# Patient Record
Sex: FEMALE | Race: WHITE | NOT HISPANIC OR LATINO | Employment: FULL TIME | URBAN - NONMETROPOLITAN AREA
[De-identification: names, ages, dates, MRNs, and addresses within clinical notes are randomized per-mention and may not be internally consistent; named-entity substitution may affect disease eponyms.]

---

## 2017-03-06 ENCOUNTER — HOSPITAL ENCOUNTER (OUTPATIENT)
Dept: ULTRASOUND IMAGING | Facility: HOSPITAL | Age: 42
Discharge: HOME | End: 2017-03-06
Admitting: NURSE PRACTITIONER

## 2017-03-06 ENCOUNTER — TRANSCRIBE ORDERS (OUTPATIENT)
Dept: LAB | Facility: HOSPITAL | Age: 42
End: 2017-03-06

## 2017-03-06 DIAGNOSIS — N94.89 PELVIC PRESSURE SYNDROME: ICD-10-CM

## 2017-03-06 DIAGNOSIS — N94.89 PELVIC PRESSURE SYNDROME: Primary | ICD-10-CM

## 2017-03-06 PROCEDURE — 76830 TRANSVAGINAL US NON-OB: CPT

## 2017-03-14 ENCOUNTER — OFFICE VISIT (OUTPATIENT)
Dept: OBSTETRICS AND GYNECOLOGY | Facility: CLINIC | Age: 42
End: 2017-03-14

## 2017-03-14 VITALS
DIASTOLIC BLOOD PRESSURE: 90 MMHG | HEIGHT: 67 IN | BODY MASS INDEX: 41.28 KG/M2 | SYSTOLIC BLOOD PRESSURE: 150 MMHG | WEIGHT: 263 LBS

## 2017-03-14 DIAGNOSIS — R10.2 PELVIC PAIN IN FEMALE: Primary | ICD-10-CM

## 2017-03-14 DIAGNOSIS — D39.10 OVARIAN LOW MALIGNANT POTENTIAL TUMOR: ICD-10-CM

## 2017-03-14 DIAGNOSIS — N83.202 CYST OF LEFT OVARY: ICD-10-CM

## 2017-03-14 PROCEDURE — 99203 OFFICE O/P NEW LOW 30 MIN: CPT | Performed by: OBSTETRICS & GYNECOLOGY

## 2017-03-14 RX ORDER — LISINOPRIL 10 MG/1
TABLET ORAL
COMMUNITY
Start: 2017-01-24 | End: 2019-10-24

## 2017-03-14 RX ORDER — LEVOTHYROXINE SODIUM 112 UG/1
112 TABLET ORAL DAILY
COMMUNITY
End: 2019-10-24

## 2017-03-14 RX ORDER — FERROUS SULFATE 325(65) MG
650 TABLET ORAL NIGHTLY
COMMUNITY

## 2017-03-14 NOTE — PROGRESS NOTES
Subjective   Gaviota Casiano is a 42 y.o. female is being seen for consultation today at the request of LIDIA Mobley.  1 week history of midline severe pelvic pain that has now resolved.  S/P Rt S&O for suspected benign neoplasm 8 years ago but pathology showed LMP.  Had referral with [facility] and no adjuvant therapy or additional surgery indicated.  Has long standing hx of lower abdominal pressure.    History of Present Illness    The following portions of the patient's history were reviewed and updated as appropriate: allergies, current medications, past family history, past medical history, past social history, past surgical history and problem list.    Review of Systems   Constitutional: Negative for fever and unexpected weight change.   Eyes: Negative for photophobia and visual disturbance.   Respiratory: Negative for cough, choking and shortness of breath.    Cardiovascular: Negative for chest pain and leg swelling.   Gastrointestinal: Negative for abdominal pain, constipation, diarrhea and nausea.   Endocrine: Negative for cold intolerance and heat intolerance.   Genitourinary: Positive for pelvic pain. Negative for dyspareunia, menstrual problem, vaginal bleeding and vaginal discharge.   Musculoskeletal: Negative for back pain and gait problem.   Neurological: Negative for light-headedness, numbness and headaches.   Psychiatric/Behavioral: Negative for confusion, decreased concentration and dysphoric mood.       Objective   Physical Exam   Constitutional: She is oriented to person, place, and time. She appears well-developed and well-nourished.   Neck: Normal range of motion. Neck supple. No tracheal deviation present. No thyromegaly present.   Cardiovascular: Normal rate and regular rhythm.    Pulmonary/Chest: Effort normal and breath sounds normal.   Abdominal: Soft. Bowel sounds are normal. She exhibits no distension. There is no tenderness.   Neurological: She is alert and oriented to person,  place, and time.   Skin: Skin is warm and dry.   Psychiatric: She has a normal mood and affect. Her behavior is normal. Judgment and thought content normal.   Nursing note and vitals reviewed.        Assessment/Plan   Gaviota was seen today for transitional care management.    Diagnoses and all orders for this visit:    Pelvic pain in female    Cyst of left ovary  Comments:  Appears hemorrhagic but given history, CT indicated to ensure lack of extrapelvic disease.    Ovarian low malignant potential tumor  Comments:  2009 had removal and referral to [facility].  Surveillance to date has been unremarkable.  Orders:  -     CT abdomen pelvis w contrast; Future  -     AFP Tumor Marker  -       -     Cancer Antigen 19-9  -     CEA  -     Lactate Dehydrogenase  -     Ovarian Malignancy Risk-MAKENNA Aguilar MD

## 2017-03-15 ENCOUNTER — HOSPITAL ENCOUNTER (OUTPATIENT)
Dept: CT IMAGING | Facility: HOSPITAL | Age: 42
Discharge: HOME | End: 2017-03-15
Attending: OBSTETRICS & GYNECOLOGY | Admitting: OBSTETRICS & GYNECOLOGY

## 2017-03-15 DIAGNOSIS — D39.10 OVARIAN LOW MALIGNANT POTENTIAL TUMOR: ICD-10-CM

## 2017-03-15 LAB — CREAT BLDA-MCNC: 1 MG/DL (ref 0.6–1.3)

## 2017-03-15 PROCEDURE — 0 IOPAMIDOL PER 1 ML: Performed by: OBSTETRICS & GYNECOLOGY

## 2017-03-15 PROCEDURE — 74177 CT ABD & PELVIS W/CONTRAST: CPT

## 2017-03-15 PROCEDURE — 82565 ASSAY OF CREATININE: CPT

## 2017-03-15 RX ADMIN — IOPAMIDOL 100 ML: 755 INJECTION, SOLUTION INTRAVENOUS at 08:00

## 2017-03-16 LAB
AFP-TM SERPL-MCNC: 2.5 NG/ML (ref 0–8.3)
CANCER AG125 SERPL-ACNC: 20.7 U/ML (ref 0–38.1)
CANCER AG125 SERPL-ACNC: 23.7 U/ML (ref 0–35)
CANCER AG19-9 SERPL-ACNC: 1 U/ML (ref 0–35)
CEA SERPL-MCNC: 0.31 NG/ML (ref 0–5)
HE4 SERPL-SCNC: 52 PMOL/L (ref 0–150)
LABORATORY COMMENT REPORT: NORMAL
LDH SERPL-CCNC: 604 U/L (ref 265–665)
POSTMENOPAUSAL INTERP: LOW: NORMAL
PREMENOPAUSAL INTERP: LOW: NORMAL
ROMA SCORE POSTMEN SERPL: 1.59
ROMA SCORE PREMEN SERPL: 0.83

## 2017-03-17 ENCOUNTER — TELEPHONE (OUTPATIENT)
Dept: OBSTETRICS AND GYNECOLOGY | Facility: CLINIC | Age: 42
End: 2017-03-17

## 2017-03-20 ENCOUNTER — OFFICE VISIT (OUTPATIENT)
Dept: OBSTETRICS AND GYNECOLOGY | Facility: CLINIC | Age: 42
End: 2017-03-20

## 2017-03-20 VITALS
DIASTOLIC BLOOD PRESSURE: 92 MMHG | WEIGHT: 266 LBS | SYSTOLIC BLOOD PRESSURE: 132 MMHG | BODY MASS INDEX: 41.75 KG/M2 | HEIGHT: 67 IN

## 2017-03-20 DIAGNOSIS — Z78.9 NON-SMOKER: ICD-10-CM

## 2017-03-20 DIAGNOSIS — N83.202 CYST OF LEFT OVARY: ICD-10-CM

## 2017-03-20 DIAGNOSIS — R10.2 PELVIC PAIN IN FEMALE: Primary | ICD-10-CM

## 2017-03-20 PROCEDURE — 99213 OFFICE O/P EST LOW 20 MIN: CPT | Performed by: OBSTETRICS & GYNECOLOGY

## 2017-03-20 NOTE — PROGRESS NOTES
Rachel Casiano is a 42 y.o. female is here today for follow-up.  CT scan unremarkable and tumor markers all normal.  Pain has subsided.    Pelvic Pain   The patient's primary symptoms include pelvic pain. The patient's pertinent negatives include no genital itching, genital lesions, genital odor, genital rash, missed menses, vaginal bleeding or vaginal discharge. This is a new problem. The current episode started 1 to 4 weeks ago. The problem occurs daily. The problem has been resolved. The pain is moderate. The problem affects the left side. She is not pregnant. Pertinent negatives include no abdominal pain, anorexia, back pain, chills, constipation, diarrhea, discolored urine, dysuria, fever, flank pain, frequency, headaches, hematuria, joint pain, joint swelling, nausea, painful intercourse, rash, sore throat, urgency or vomiting. Nothing aggravates the symptoms. She has tried nothing for the symptoms. The treatment provided no relief. She is sexually active. No, her partner does not have an STD. Her menstrual history has been regular. Her past medical history is significant for a  section and a gynecological surgery. There is no history of an abdominal surgery, an ectopic pregnancy, endometriosis, herpes simplex, menorrhagia, metrorrhagia, miscarriage, ovarian cysts, perineal abscess, PID, an STD, a terminated pregnancy or vaginosis.       The following portions of the patient's history were reviewed and updated as appropriate: allergies, current medications, past family history, past medical history, past social history, past surgical history and problem list.    Review of Systems   Constitutional: Negative for chills and fever.   HENT: Negative for sore throat.    Gastrointestinal: Negative for abdominal pain, anorexia, constipation, diarrhea, nausea and vomiting.   Genitourinary: Positive for pelvic pain. Negative for dysuria, flank pain, frequency, hematuria, menorrhagia, missed  menses, urgency and vaginal discharge.   Musculoskeletal: Negative for back pain and joint pain.   Skin: Negative for rash.   Neurological: Negative for headaches.       Objective   Physical Exam   Constitutional: She is oriented to person, place, and time. She appears well-developed and well-nourished.   Neck: Normal range of motion. Neck supple.   Cardiovascular: Normal rate.    Pulmonary/Chest: Effort normal.   Abdominal: Soft. Bowel sounds are normal.   Neurological: She is alert and oriented to person, place, and time.   Skin: Skin is warm and dry.   Psychiatric: She has a normal mood and affect. Her behavior is normal. Judgment and thought content normal.   Nursing note and vitals reviewed.        Assessment/Plan   Gaviota was seen today for pelvic pain.    Diagnoses and all orders for this visit:    Pelvic pain in female    Cyst of left ovary    Non-smoker      Repeat U/S in 4 weeks    Nawaf Aguilar MD

## 2017-04-11 ENCOUNTER — OFFICE VISIT (OUTPATIENT)
Dept: GASTROENTEROLOGY | Facility: CLINIC | Age: 42
End: 2017-04-11

## 2017-04-11 VITALS
DIASTOLIC BLOOD PRESSURE: 80 MMHG | OXYGEN SATURATION: 100 % | HEIGHT: 67 IN | HEART RATE: 72 BPM | WEIGHT: 266 LBS | SYSTOLIC BLOOD PRESSURE: 128 MMHG | BODY MASS INDEX: 41.75 KG/M2

## 2017-04-11 DIAGNOSIS — Z78.9 NONSMOKER: ICD-10-CM

## 2017-04-11 DIAGNOSIS — Z79.1 NSAID LONG-TERM USE: ICD-10-CM

## 2017-04-11 DIAGNOSIS — E66.9 OBESITY, UNSPECIFIED OBESITY SEVERITY, UNSPECIFIED OBESITY TYPE: ICD-10-CM

## 2017-04-11 DIAGNOSIS — R19.7 DIARRHEA IN ADULT PATIENT: ICD-10-CM

## 2017-04-11 DIAGNOSIS — I10 HTN (HYPERTENSION), BENIGN: ICD-10-CM

## 2017-04-11 DIAGNOSIS — R11.0 NAUSEA: ICD-10-CM

## 2017-04-11 DIAGNOSIS — R10.11 RIGHT UPPER QUADRANT ABDOMINAL PAIN: Primary | ICD-10-CM

## 2017-04-11 PROCEDURE — 99204 OFFICE O/P NEW MOD 45 MIN: CPT | Performed by: CLINICAL NURSE SPECIALIST

## 2017-04-11 RX ORDER — SODIUM, POTASSIUM,MAG SULFATES 17.5-3.13G
SOLUTION, RECONSTITUTED, ORAL ORAL
Qty: 2 BOTTLE | Refills: 0 | Status: SHIPPED | OUTPATIENT
Start: 2017-04-11 | End: 2017-05-11 | Stop reason: HOSPADM

## 2017-04-11 RX ORDER — OMEPRAZOLE 20 MG/1
20 CAPSULE, DELAYED RELEASE ORAL DAILY
Qty: 30 CAPSULE | Refills: 11 | Status: SHIPPED | OUTPATIENT
Start: 2017-04-11 | End: 2019-10-24

## 2017-04-11 NOTE — PROGRESS NOTES
Gaviota Casiano  1975  Chief Complaint   Patient presents with   • GI Problem     New patient ref by Bere Long NP for abdominal pain worse with bowel movements     Subjective   HPI  Gaviota Casiano is a 42 y.o. female who presents with a complaint of abdominal pain that is to the right side. It is associated with nausea and she has had it off and on for years. She is s/p cholecystectomy. She rates it a 9 or 10 out of 10. It is worse with a BM and radiates down to the lower right side and umbilical region. It is like a twist or squeezing pain lasting up to a several minutes. No BRBPR. No melena. She has had diarrhea. She says that this is new for her. She has not been on any recent antibiotics. She has already had 3 BMs this morning up to 5 times per day. No fever chills or sweats. She has had this change in bowels for approx 2 months. She had never had a colonoscopy. No reflux or indigestion. No upper Endoscopy. She has an order for labs by her PCP that she not had drawn yet. She has a long hx of iron def anemia chronicity is unknown, no labs today for review. She denies any family hx for colon cancer.  She does take 2-4 Advil per day and has for years.     Past Medical History:   Diagnosis Date   • Anemia    • Hypertension    • Hypothyroidism    • Ovarian cancer      Past Surgical History:   Procedure Laterality Date   •  SECTION     • CHOLECYSTECTOMY     • GASTRIC BYPASS     • LAPAROTOMY OOPHERECTOMY Left     for ovarian cancer   • MYRINGOTOMY W/ TUBES     • NOSE SURGERY     • TONSILLECTOMY       Outpatient Prescriptions Marked as Taking for the 17 encounter (Office Visit) with LIDIA Parekh   Medication Sig Dispense Refill   • Cyanocobalamin (VITAMIN B 12 PO) Take  by mouth.     • ferrous sulfate 325 (65 FE) MG tablet Take 325 mg by mouth Daily With Breakfast.     • levothyroxine (SYNTHROID, LEVOTHROID) 112 MCG tablet Take 112 mcg by mouth Daily.     • lisinopril  (PRINIVIL,ZESTRIL) 10 MG tablet        Allergies   Allergen Reactions   • Darvon [Propoxyphene] Hives     Associated with Darvocet which has been pulled from shelve.   • Penicillins Hives   • Percocet [Oxycodone-Acetaminophen] Hives     Social History     Social History   • Marital status: Unknown     Spouse name: N/A   • Number of children: N/A   • Years of education: N/A     Occupational History   • Not on file.     Social History Main Topics   • Smoking status: Former Smoker   • Smokeless tobacco: Not on file   • Alcohol use Yes      Comment: Occasional   • Drug use: No   • Sexual activity: Yes     Partners: Male     Birth control/ protection: None     Other Topics Concern   • Not on file     Social History Narrative     Family History   Problem Relation Age of Onset   • Diabetes Father    • Coronary artery disease Father    • Breast cancer Father    • Diabetes Mother    • Coronary artery disease Mother    • Stroke Mother    • No Known Problems Brother    • No Known Problems Sister    • No Known Problems Daughter    • No Known Problems Brother    • No Known Problems Brother    • No Known Problems Sister    • Colon cancer Neg Hx    • Colon polyps Neg Hx      Health Maintenance   Topic Date Due   • TDAP/TD VACCINES (1 - Tdap) 02/27/1993   • INFLUENZA VACCINE  08/01/2016   • PAP SMEAR  03/14/2017     Review of Systems   Constitutional: Negative for activity change, appetite change, chills, diaphoresis, fatigue, fever and unexpected weight change.   HENT: Negative for ear pain, hearing loss, mouth sores, sore throat, trouble swallowing and voice change.    Eyes: Negative.    Respiratory: Negative for cough, choking, shortness of breath and wheezing.    Cardiovascular: Negative for chest pain and palpitations.   Gastrointestinal: Positive for abdominal pain and diarrhea. Negative for blood in stool, constipation, nausea and vomiting.   Endocrine: Negative for cold intolerance and heat intolerance.   Genitourinary:  "Negative for decreased urine volume, dysuria, frequency, hematuria and urgency.   Musculoskeletal: Negative for back pain, gait problem and myalgias.   Skin: Negative for color change, pallor and rash.   Allergic/Immunologic: Negative for food allergies and immunocompromised state.   Neurological: Negative for dizziness, tremors, seizures, syncope, weakness, light-headedness, numbness and headaches.   Hematological: Negative for adenopathy. Does not bruise/bleed easily.   Psychiatric/Behavioral: Negative for agitation and confusion. The patient is not nervous/anxious.    All other systems reviewed and are negative.    Objective   Vitals:    04/11/17 0821   BP: 128/80   Pulse: 72   SpO2: 100%   Weight: 266 lb (121 kg)   Height: 67\" (170.2 cm)     Body mass index is 41.66 kg/(m^2).  Physical Exam   Constitutional: She is oriented to person, place, and time. She appears well-developed and well-nourished.   HENT:   Head: Normocephalic and atraumatic.   Eyes: Pupils are equal, round, and reactive to light.   Neck: Normal range of motion. Neck supple. No tracheal deviation present.   Cardiovascular: Normal rate, regular rhythm and normal heart sounds.  Exam reveals no gallop and no friction rub.    No murmur heard.  Pulmonary/Chest: Effort normal and breath sounds normal. No respiratory distress. She has no wheezes. She has no rales. She exhibits no tenderness.   Abdominal: Soft. Bowel sounds are normal. She exhibits no distension. There is no hepatosplenomegaly. There is no tenderness. There is no rigidity, no rebound and no guarding.   Musculoskeletal: Normal range of motion. She exhibits no edema, tenderness or deformity.   Neurological: She is alert and oriented to person, place, and time. She has normal reflexes.   Skin: Skin is warm and dry. No rash noted. No pallor.   Psychiatric: She has a normal mood and affect. Her behavior is normal. Judgment and thought content normal.     Assessment/Plan   Gaviota was seen " today for gi problem.    Diagnoses and all orders for this visit:    Right upper quadrant abdominal pain  -     omeprazole (priLOSEC) 20 MG capsule; Take 1 capsule by mouth Daily.  -     Case Request; Standing  -     Implement Anesthesia Orders Day of Procedure; Standing  -     Obtain Informed Consent; Standing  -     Verify Informed Consent; Standing  -     Case Request    Nausea    Diarrhea in adult patient  -     SUPREP BOWEL PREP solution oral solution; Take as directed by office instructions provided  -     Gastrointestinal Panel, PCR  -     Fecal Leukocytes  -     Case Request; Standing  -     Implement Anesthesia Orders Day of Procedure; Standing  -     Obtain Informed Consent; Standing  -     Verify Informed Consent; Standing  -     Verify bowel prep was successful; Standing  -     Case Request    Nonsmoker    Obesity, unspecified obesity severity, unspecified obesity type    NSAID long-term use  Comments:  2-4 advil per day; suggested discontinuation    HTN (hypertension), benign  Comments:  continue BP medication the am of procedure    DISCUSSED ALL DIFFERENTIALS TO INCLUDE INFECTIOUS VS OTHER CAUSING HER SYMPTOMS. WE DISCUSSED NSAIDS OR OTHER CONTRIBUTING FACTORS. DIETARY MODIFICATIONS DISCUSSED AS WELL. EXAM BENIGN    COLONOSCOPY WITH ANESTHESIA (N/A)  EMR Dragon/transcription disclaimer: Much of this encounter note is electronic transcription/translation of spoken language to printed text. The electronic translation of spoken language may be erroneous, or at times, nonsensical words or phrases may be inadvertently transcribed. Although I have reviewed the note for such errors, some may still exist.  Body mass index is 41.66 kg/(m^2).  Return if symptoms worsen or fail to improve.      All risks, benefits, alternatives, and indications of colonoscopy and/or Endoscopy procedure have been discussed with the patient. Risks to include perforation of the colon requiring possible surgery or colostomy, risk of  bleeding from biopsies or removal of colon tissue, possibility of missing a colon polyp or cancer, or adverse drug reaction.  Benefits to include the diagnosis and management of disease of the colon and rectum. Alternatives to include barium enema, radiographic evaluation, lab testing or no intervention. Pt verbalizes understanding and agrees.

## 2017-04-17 ENCOUNTER — PROCEDURE VISIT (OUTPATIENT)
Dept: OBSTETRICS AND GYNECOLOGY | Facility: CLINIC | Age: 42
End: 2017-04-17

## 2017-04-17 ENCOUNTER — OFFICE VISIT (OUTPATIENT)
Dept: OBSTETRICS AND GYNECOLOGY | Facility: CLINIC | Age: 42
End: 2017-04-17

## 2017-04-17 VITALS
BODY MASS INDEX: 41.59 KG/M2 | SYSTOLIC BLOOD PRESSURE: 130 MMHG | HEIGHT: 67 IN | DIASTOLIC BLOOD PRESSURE: 80 MMHG | WEIGHT: 265 LBS

## 2017-04-17 DIAGNOSIS — N83.202 CYST OF LEFT OVARY: Primary | ICD-10-CM

## 2017-04-17 DIAGNOSIS — Z78.9 NON-SMOKER: ICD-10-CM

## 2017-04-17 PROCEDURE — 99213 OFFICE O/P EST LOW 20 MIN: CPT | Performed by: OBSTETRICS & GYNECOLOGY

## 2017-04-17 PROCEDURE — 76830 TRANSVAGINAL US NON-OB: CPT | Performed by: OBSTETRICS & GYNECOLOGY

## 2017-04-17 NOTE — PROGRESS NOTES
Subjective   Gaviota Casiano is a 42 y.o. female is here today for follow-up.  Pain has resolved.  U/S today shows 10 cm uterus and 2 simple cysts on left ovary.  Complex mass has resolved.    Pelvic Pain   The patient's primary symptoms include pelvic pain. The patient's pertinent negatives include no genital itching, genital lesions, genital odor, genital rash, missed menses, vaginal bleeding or vaginal discharge. This is a new problem. The current episode started more than 1 month ago. The problem occurs rarely. The problem has been resolved. The pain is moderate. The problem affects the left side. She is not pregnant. Pertinent negatives include no abdominal pain, anorexia, back pain, chills, constipation, diarrhea, discolored urine, dysuria, fever, flank pain, frequency, headaches, hematuria, joint pain, joint swelling, nausea, painful intercourse, rash, sore throat, urgency or vomiting. Nothing aggravates the symptoms. She has tried nothing for the symptoms. The treatment provided significant relief. She is sexually active. No, her partner does not have an STD. Her menstrual history has been regular. Her past medical history is significant for an abdominal surgery.       The following portions of the patient's history were reviewed and updated as appropriate: allergies, current medications, past family history, past medical history, past social history, past surgical history and problem list.    Review of Systems   Constitutional: Negative for chills and fever.   HENT: Negative for sore throat.    Eyes: Negative for photophobia and visual disturbance.   Respiratory: Negative for cough, choking and shortness of breath.    Cardiovascular: Negative for chest pain and leg swelling.   Gastrointestinal: Negative for abdominal pain, anorexia, constipation, diarrhea, nausea and vomiting.   Endocrine: Negative for cold intolerance and heat intolerance.   Genitourinary: Positive for pelvic pain. Negative for dysuria,  flank pain, frequency, hematuria, missed menses, urgency and vaginal discharge.   Musculoskeletal: Negative for back pain and joint pain.   Skin: Negative for rash.   Neurological: Negative for headaches.   Psychiatric/Behavioral: Negative for confusion, decreased concentration and dysphoric mood.       Objective   Physical Exam   Constitutional: She is oriented to person, place, and time. She appears well-developed and well-nourished.   Neck: Normal range of motion. Neck supple.   Cardiovascular: Normal rate.    Pulmonary/Chest: Effort normal.   Abdominal: Soft.   Neurological: She is alert and oriented to person, place, and time.   Skin: Skin is warm and dry.   Psychiatric: She has a normal mood and affect. Her behavior is normal. Judgment and thought content normal.   Nursing note and vitals reviewed.        Assessment/Plan   Gaviota was seen today for pelvic pain.    Diagnoses and all orders for this visit:    Cyst of left ovary    Non-smoker        Nawaf Aguilar MD

## 2017-05-09 ENCOUNTER — APPOINTMENT (OUTPATIENT)
Dept: LAB | Facility: HOSPITAL | Age: 42
End: 2017-05-09

## 2017-05-09 ENCOUNTER — TELEPHONE (OUTPATIENT)
Dept: GASTROENTEROLOGY | Facility: CLINIC | Age: 42
End: 2017-05-09

## 2017-05-09 LAB
ADV 40+41 DNA STL QL NAA+NON-PROBE: NOT DETECTED
ASTRO TYP 1-8 RNA STL QL NAA+NON-PROBE: NOT DETECTED
C CAYETANENSIS DNA STL QL NAA+NON-PROBE: NOT DETECTED
C DIFF TOX GENS STL QL NAA+PROBE: NOT DETECTED
CAMPY SP DNA.DIARRHEA STL QL NAA+PROBE: NOT DETECTED
CRYPTOSP STL CULT: NOT DETECTED
E COLI DNA SPEC QL NAA+PROBE: NOT DETECTED
E HISTOLYT AG STL-ACNC: NOT DETECTED
EAEC PAA PLAS AGGR+AATA ST NAA+NON-PRB: NOT DETECTED
EC STX1+STX2 GENES STL QL NAA+NON-PROBE: NOT DETECTED
EPEC EAE GENE STL QL NAA+NON-PROBE: NOT DETECTED
ETEC LTA+ST1A+ST1B TOX ST NAA+NON-PROBE: NOT DETECTED
G LAMBLIA DNA SPEC QL NAA+PROBE: NOT DETECTED
NOROVIRUS GI+II RNA STL QL NAA+NON-PROBE: NOT DETECTED
P SHIGELLOIDES DNA STL QL NAA+NON-PROBE: NOT DETECTED
RV RNA STL NAA+PROBE: NOT DETECTED
SALMONELLA DNA SPEC QL NAA+PROBE: NOT DETECTED
SAPO I+II+IV+V RNA STL QL NAA+NON-PROBE: NOT DETECTED
SHIGELLA SP+EIEC IPAH ST NAA+NON-PROBE: NOT DETECTED
V CHOLERAE DNA SPEC QL NAA+PROBE: NOT DETECTED
VIBRIO DNA SPEC NAA+PROBE: NOT DETECTED
WBC STL QL MICRO: NORMAL
YERSINIA STL CULT: NOT DETECTED

## 2017-05-09 PROCEDURE — 87205 SMEAR GRAM STAIN: CPT | Performed by: CLINICAL NURSE SPECIALIST

## 2017-05-09 PROCEDURE — 87507 IADNA-DNA/RNA PROBE TQ 12-25: CPT | Performed by: CLINICAL NURSE SPECIALIST

## 2017-05-10 ENCOUNTER — ANESTHESIA EVENT (OUTPATIENT)
Dept: GASTROENTEROLOGY | Facility: HOSPITAL | Age: 42
End: 2017-05-10

## 2017-05-11 ENCOUNTER — HOSPITAL ENCOUNTER (OUTPATIENT)
Facility: HOSPITAL | Age: 42
Setting detail: HOSPITAL OUTPATIENT SURGERY
Discharge: HOME | End: 2017-05-11
Attending: INTERNAL MEDICINE | Admitting: INTERNAL MEDICINE

## 2017-05-11 ENCOUNTER — ANESTHESIA (OUTPATIENT)
Dept: GASTROENTEROLOGY | Facility: HOSPITAL | Age: 42
End: 2017-05-11

## 2017-05-11 VITALS
HEART RATE: 74 BPM | WEIGHT: 262 LBS | BODY MASS INDEX: 41.12 KG/M2 | OXYGEN SATURATION: 96 % | TEMPERATURE: 99.4 F | HEIGHT: 67 IN | RESPIRATION RATE: 20 BRPM | SYSTOLIC BLOOD PRESSURE: 112 MMHG | DIASTOLIC BLOOD PRESSURE: 92 MMHG

## 2017-05-11 DIAGNOSIS — R19.7 DIARRHEA IN ADULT PATIENT: ICD-10-CM

## 2017-05-11 DIAGNOSIS — R10.11 RIGHT UPPER QUADRANT ABDOMINAL PAIN: ICD-10-CM

## 2017-05-11 LAB — B-HCG UR QL: NEGATIVE

## 2017-05-11 PROCEDURE — 45380 COLONOSCOPY AND BIOPSY: CPT | Performed by: INTERNAL MEDICINE

## 2017-05-11 PROCEDURE — 88305 TISSUE EXAM BY PATHOLOGIST: CPT | Performed by: INTERNAL MEDICINE

## 2017-05-11 PROCEDURE — 43239 EGD BIOPSY SINGLE/MULTIPLE: CPT | Performed by: INTERNAL MEDICINE

## 2017-05-11 PROCEDURE — 45385 COLONOSCOPY W/LESION REMOVAL: CPT | Performed by: INTERNAL MEDICINE

## 2017-05-11 PROCEDURE — 81025 URINE PREGNANCY TEST: CPT | Performed by: ANESTHESIOLOGY

## 2017-05-11 PROCEDURE — 25010000002 PROPOFOL 10 MG/ML EMULSION: Performed by: NURSE ANESTHETIST, CERTIFIED REGISTERED

## 2017-05-11 RX ORDER — PROPOFOL 10 MG/ML
VIAL (ML) INTRAVENOUS AS NEEDED
Status: DISCONTINUED | OUTPATIENT
Start: 2017-05-11 | End: 2017-05-11 | Stop reason: SURG

## 2017-05-11 RX ORDER — SODIUM CHLORIDE 0.9 % (FLUSH) 0.9 %
1-10 SYRINGE (ML) INJECTION AS NEEDED
Status: DISCONTINUED | OUTPATIENT
Start: 2017-05-11 | End: 2017-05-11 | Stop reason: HOSPADM

## 2017-05-11 RX ORDER — LIDOCAINE HYDROCHLORIDE 20 MG/ML
INJECTION, SOLUTION INFILTRATION; PERINEURAL AS NEEDED
Status: DISCONTINUED | OUTPATIENT
Start: 2017-05-11 | End: 2017-05-11 | Stop reason: SURG

## 2017-05-11 RX ORDER — SODIUM CHLORIDE 9 MG/ML
100 INJECTION, SOLUTION INTRAVENOUS CONTINUOUS
Status: DISCONTINUED | OUTPATIENT
Start: 2017-05-11 | End: 2017-05-11 | Stop reason: HOSPADM

## 2017-05-11 RX ADMIN — SODIUM CHLORIDE 100 ML/HR: 9 INJECTION, SOLUTION INTRAVENOUS at 10:29

## 2017-05-11 RX ADMIN — PROPOFOL 100 MG: 10 INJECTION, EMULSION INTRAVENOUS at 11:51

## 2017-05-11 RX ADMIN — LIDOCAINE HYDROCHLORIDE 100 MG: 20 INJECTION, SOLUTION INFILTRATION; PERINEURAL at 11:51

## 2017-05-11 RX ADMIN — PROPOFOL 310 MG: 10 INJECTION, EMULSION INTRAVENOUS at 11:52

## 2017-05-11 RX ADMIN — LIDOCAINE HYDROCHLORIDE 0.5 ML: 10 INJECTION, SOLUTION EPIDURAL; INFILTRATION; INTRACAUDAL; PERINEURAL at 10:29

## 2017-05-12 LAB
CYTO UR: NORMAL
LAB AP CASE REPORT: NORMAL
LAB AP CLINICAL INFORMATION: NORMAL
Lab: NORMAL
PATH REPORT.FINAL DX SPEC: NORMAL
PATH REPORT.GROSS SPEC: NORMAL

## 2017-08-04 ENCOUNTER — OFFICE VISIT (OUTPATIENT)
Dept: OBSTETRICS AND GYNECOLOGY | Facility: CLINIC | Age: 42
End: 2017-08-04

## 2017-08-04 VITALS
WEIGHT: 265 LBS | DIASTOLIC BLOOD PRESSURE: 86 MMHG | SYSTOLIC BLOOD PRESSURE: 126 MMHG | HEIGHT: 67 IN | BODY MASS INDEX: 41.59 KG/M2

## 2017-08-04 DIAGNOSIS — Z01.419 WELL WOMAN EXAM WITH ROUTINE GYNECOLOGICAL EXAM: Primary | ICD-10-CM

## 2017-08-04 PROCEDURE — G0123 SCREEN CERV/VAG THIN LAYER: HCPCS | Performed by: NURSE PRACTITIONER

## 2017-08-04 PROCEDURE — 99396 PREV VISIT EST AGE 40-64: CPT | Performed by: NURSE PRACTITIONER

## 2017-08-04 NOTE — PROGRESS NOTES
Subjective   Gaviota Casiano is a 42 y.o. female  YOB: 1975    Est PT is here today for yearly visit.  Pt states that she is doing good with no c/o  Pt does need order for Mammo today as well      Chief Complaint   Patient presents with   • Gynecologic Exam     Here for annual exam, pap smear. Has order for BIC. No gyn complaints.        HPI    The following portions of the patient's history were reviewed and updated as appropriate: allergies, current medications, past family history, past medical history, past social history, past surgical history and problem list.    Allergies   Allergen Reactions   • Darvon [Propoxyphene] Hives     Associated with Darvocet which has been pulled from shelve.   • Penicillins Hives   • Percocet [Oxycodone-Acetaminophen] Hives       Past Medical History:   Diagnosis Date   • Anemia    • Hypertension    • Hypothyroidism    • Ovarian cancer     2009       Family History   Problem Relation Age of Onset   • Diabetes Father    • Coronary artery disease Father    • Breast cancer Father 50     Had genic testing.    • Diabetes Mother    • Coronary artery disease Mother    • Stroke Mother    • No Known Problems Brother    • No Known Problems Sister    • No Known Problems Daughter    • No Known Problems Brother    • No Known Problems Brother    • Ovarian cancer Sister 20   • Colon cancer Neg Hx    • Colon polyps Neg Hx        Social History     Social History   • Marital status: Unknown     Spouse name: N/A   • Number of children: N/A   • Years of education: N/A     Occupational History   • Not on file.     Social History Main Topics   • Smoking status: Never Smoker   • Smokeless tobacco: Never Used   • Alcohol use Yes      Comment: Occasional   • Drug use: No   • Sexual activity: Yes     Partners: Male     Birth control/ protection: None     Other Topics Concern   • Not on file     Social History Narrative         Current Outpatient Prescriptions:   •  Cyanocobalamin  (B-12 COMPLIANCE INJECTION IJ), Inject 1 dose as directed Every 30 (Thirty) Days., Disp: , Rfl:   •  ferrous sulfate 325 (65 FE) MG tablet, Take 325 mg by mouth Daily With Breakfast., Disp: , Rfl:   •  levothyroxine (SYNTHROID, LEVOTHROID) 112 MCG tablet, Take 112 mcg by mouth Daily., Disp: , Rfl:   •  lisinopril (PRINIVIL,ZESTRIL) 10 MG tablet, , Disp: , Rfl:   •  omeprazole (priLOSEC) 20 MG capsule, Take 1 capsule by mouth Daily., Disp: 30 capsule, Rfl: 11    Menstrual History:  OB History      Para Term  AB TAB SAB Ectopic Multiple Living    1 1 0 0 0 0 0 0 0 1           Patient's last menstrual period was 2017 (approximate).    Sexual History:         Could not be calculated    Past Surgical History:   Procedure Laterality Date   •  SECTION     • CHOLECYSTECTOMY     • COLONOSCOPY N/A 2017    Procedure: COLONOSCOPY WITH ANESTHESIA;  Surgeon: Suzan Muñoz MD;  Location: [facility] ENDOSCOPY;  Service:    • ENDOSCOPY N/A 2017    Procedure: ESOPHAGOGASTRODUODENOSCOPY WITH ANESTHESIA;  Surgeon: Suzan Muñoz MD;  Location: [facility] ENDOSCOPY;  Service:    • GASTRIC BYPASS     • LAPAROTOMY OOPHERECTOMY Left     for ovarian cancer   • MYRINGOTOMY W/ TUBES     • NOSE SURGERY     • TONSILLECTOMY         Review of Systems   Constitutional: Negative for activity change, appetite change, chills, diaphoresis, fatigue, fever and unexpected weight change.   HENT: Negative for congestion, ear discharge, ear pain, facial swelling, hearing loss, mouth sores, nosebleeds, postnasal drip, rhinorrhea, sinus pressure, sneezing, sore throat, tinnitus, trouble swallowing and voice change.    Eyes: Negative for photophobia, pain, discharge, redness, itching and visual disturbance.   Respiratory: Negative for apnea, cough, choking, chest tightness and shortness of breath.    Cardiovascular: Negative for chest pain, palpitations and leg swelling.   Gastrointestinal: Negative for abdominal distention,  abdominal pain, anal bleeding, blood in stool, constipation, diarrhea, nausea, rectal pain and vomiting.   Endocrine: Negative for cold intolerance and heat intolerance.   Genitourinary: Negative for decreased urine volume, difficulty urinating, dyspareunia, flank pain, frequency, genital sores, hematuria, menstrual problem, pelvic pain, urgency, vaginal bleeding, vaginal discharge and vaginal pain.   Musculoskeletal: Negative for arthralgias, back pain, joint swelling and myalgias.   Skin: Negative for color change and rash.   Allergic/Immunologic: Negative for environmental allergies.   Neurological: Negative for dizziness, syncope, weakness, numbness and headaches.   Hematological: Negative for adenopathy.   Psychiatric/Behavioral: Negative for agitation, confusion and sleep disturbance. The patient is not nervous/anxious.        Objective   Physical Exam   Constitutional: She is oriented to person, place, and time. She appears well-developed and well-nourished. No distress.   HENT:   Head: Normocephalic.   Right Ear: External ear normal.   Left Ear: External ear normal.   Eyes: Conjunctivae are normal. Right eye exhibits no discharge. Left eye exhibits no discharge. No scleral icterus.   Neck: Normal range of motion. Neck supple. Carotid bruit is not present. No tracheal deviation present. No thyromegaly present.   Cardiovascular: Normal rate, regular rhythm, normal heart sounds and intact distal pulses.    No murmur heard.  Pulmonary/Chest: Effort normal and breath sounds normal. No respiratory distress. She has no wheezes. Right breast exhibits no inverted nipple, no mass, no nipple discharge, no skin change and no tenderness. Left breast exhibits no inverted nipple, no mass, no nipple discharge, no skin change and no tenderness. Breasts are symmetrical. There is no breast swelling.   Abdominal: Soft. She exhibits no distension and no mass. There is no tenderness. There is no guarding. No hernia. Hernia  confirmed negative in the right inguinal area and confirmed negative in the left inguinal area.   Genitourinary: Rectum normal, vagina normal and uterus normal. Rectal exam shows no mass. No breast tenderness, discharge or bleeding. Pelvic exam was performed with patient supine. There is no rash, tenderness, lesion or injury on the right labia. There is no rash, tenderness, lesion or injury on the left labia. Uterus is not enlarged, not fixed and not tender. Cervix exhibits no motion tenderness, no discharge and no friability. Right adnexum displays no mass, no tenderness and no fullness. Left adnexum displays no mass, no tenderness and no fullness. No erythema, tenderness or bleeding in the vagina. No foreign body in the vagina. No signs of injury around the vagina. No vaginal discharge found.   Genitourinary Comments:   BSU normal  Urethral meatus  Normal  Perineum  Normal   Musculoskeletal: Normal range of motion. She exhibits no edema or tenderness.   Lymphadenopathy:        Head (right side): No submental, no submandibular, no tonsillar, no preauricular, no posterior auricular and no occipital adenopathy present.        Head (left side): No submental, no submandibular, no tonsillar, no preauricular, no posterior auricular and no occipital adenopathy present.     She has no cervical adenopathy.        Right cervical: No superficial cervical, no deep cervical and no posterior cervical adenopathy present.       Left cervical: No superficial cervical, no deep cervical and no posterior cervical adenopathy present.     She has no axillary adenopathy.        Right: No inguinal adenopathy present.        Left: No inguinal adenopathy present.   Neurological: She is alert and oriented to person, place, and time. Coordination normal.   Skin: Skin is warm and dry. No bruising and no rash noted. She is not diaphoretic. No erythema.   Psychiatric: She has a normal mood and affect. Her behavior is normal. Judgment and thought  "content normal.   Nursing note and vitals reviewed.        Vitals:    08/04/17 0911   BP: 126/86   BP Location: Left arm   Patient Position: Sitting   Cuff Size: Adult   Weight: 265 lb (120 kg)   Height: 67\" (170.2 cm)       Gaviota was seen today for gynecologic exam.    Diagnoses and all orders for this visit:    Well woman exam with routine gynecological exam  Comments:  Normal well woman exam.  Thin prep done.  Mammogram ordered.    Orders:  -     Liquid-based Pap Smear, Screening        Body mass index is 41.5 kg/(m^2).     Non-Smoker    MyChart Instructions Given       "

## 2017-08-07 ENCOUNTER — TRANSCRIBE ORDERS (OUTPATIENT)
Dept: ADMINISTRATIVE | Facility: HOSPITAL | Age: 42
End: 2017-08-07

## 2017-08-07 DIAGNOSIS — Z12.31 ENCOUNTER FOR SCREENING MAMMOGRAM FOR MALIGNANT NEOPLASM OF BREAST: Primary | ICD-10-CM

## 2017-08-08 LAB
GEN CATEG CVX/VAG CYTO-IMP: NORMAL
LAB AP CASE REPORT: NORMAL
LAB AP GYN ADDITIONAL INFORMATION: NORMAL
Lab: NORMAL
PATH INTERP SPEC-IMP: NORMAL
STAT OF ADQ CVX/VAG CYTO-IMP: NORMAL

## 2017-08-16 ENCOUNTER — HOSPITAL ENCOUNTER (OUTPATIENT)
Dept: MAMMOGRAPHY | Facility: HOSPITAL | Age: 42
Discharge: HOME | End: 2017-08-16
Admitting: NURSE PRACTITIONER

## 2017-08-16 DIAGNOSIS — Z12.31 ENCOUNTER FOR SCREENING MAMMOGRAM FOR MALIGNANT NEOPLASM OF BREAST: ICD-10-CM

## 2017-08-16 PROCEDURE — G0202 SCR MAMMO BI INCL CAD: HCPCS

## 2017-08-16 PROCEDURE — 77063 BREAST TOMOSYNTHESIS BI: CPT

## 2019-07-01 ENCOUNTER — TRANSCRIBE ORDERS (OUTPATIENT)
Dept: GENERAL RADIOLOGY | Facility: HOSPITAL | Age: 44
End: 2019-07-01

## 2019-07-01 ENCOUNTER — HOSPITAL ENCOUNTER (OUTPATIENT)
Dept: GENERAL RADIOLOGY | Facility: HOSPITAL | Age: 44
Discharge: HOME | End: 2019-07-01
Admitting: NURSE PRACTITIONER

## 2019-07-01 DIAGNOSIS — J31.0 RHINOSINUSITIS: ICD-10-CM

## 2019-07-01 DIAGNOSIS — J32.9 RHINOSINUSITIS: ICD-10-CM

## 2019-07-01 DIAGNOSIS — J31.0 RHINOSINUSITIS: Primary | ICD-10-CM

## 2019-07-01 DIAGNOSIS — J32.9 RHINOSINUSITIS: Primary | ICD-10-CM

## 2019-07-01 PROCEDURE — 70220 X-RAY EXAM OF SINUSES: CPT

## 2019-07-15 ENCOUNTER — TRANSCRIBE ORDERS (OUTPATIENT)
Dept: ADMINISTRATIVE | Facility: HOSPITAL | Age: 44
End: 2019-07-15

## 2019-07-15 DIAGNOSIS — Z12.39 SCREENING BREAST EXAMINATION: Primary | ICD-10-CM

## 2019-07-18 ENCOUNTER — HOSPITAL ENCOUNTER (OUTPATIENT)
Dept: MAMMOGRAPHY | Facility: HOSPITAL | Age: 44
Discharge: HOME | End: 2019-07-18
Admitting: NURSE PRACTITIONER

## 2019-07-18 DIAGNOSIS — Z12.39 SCREENING BREAST EXAMINATION: ICD-10-CM

## 2019-07-18 PROCEDURE — 77067 SCR MAMMO BI INCL CAD: CPT

## 2019-07-18 PROCEDURE — 77063 BREAST TOMOSYNTHESIS BI: CPT

## 2019-08-19 DIAGNOSIS — D50.8 OTHER IRON DEFICIENCY ANEMIA: Primary | ICD-10-CM

## 2019-08-21 ENCOUNTER — CONSULT (OUTPATIENT)
Dept: ONCOLOGY | Facility: CLINIC | Age: 44
End: 2019-08-21

## 2019-08-21 ENCOUNTER — APPOINTMENT (OUTPATIENT)
Dept: LAB | Facility: HOSPITAL | Age: 44
End: 2019-08-21

## 2019-08-21 VITALS
OXYGEN SATURATION: 100 % | TEMPERATURE: 97.7 F | WEIGHT: 255.7 LBS | HEART RATE: 75 BPM | BODY MASS INDEX: 40.13 KG/M2 | DIASTOLIC BLOOD PRESSURE: 84 MMHG | SYSTOLIC BLOOD PRESSURE: 132 MMHG | HEIGHT: 67 IN | RESPIRATION RATE: 16 BRPM

## 2019-08-21 DIAGNOSIS — D50.8 OTHER IRON DEFICIENCY ANEMIA: Primary | ICD-10-CM

## 2019-08-21 PROBLEM — D64.9 ABSOLUTE ANEMIA: Status: ACTIVE | Noted: 2019-08-21

## 2019-08-21 LAB
ALBUMIN SERPL-MCNC: 3.9 G/DL (ref 3.5–5)
ALBUMIN/GLOB SERPL: 1.3 G/DL (ref 1.1–2.5)
ALP SERPL-CCNC: 70 U/L (ref 24–120)
ALT SERPL W P-5'-P-CCNC: 26 U/L (ref 0–54)
ANION GAP SERPL CALCULATED.3IONS-SCNC: 7 MMOL/L (ref 4–13)
AST SERPL-CCNC: 26 U/L (ref 7–45)
BASOPHILS # BLD AUTO: 0.06 10*3/MM3 (ref 0–0.2)
BASOPHILS NFR BLD AUTO: 0.8 % (ref 0–1.5)
BILIRUB SERPL-MCNC: 0.3 MG/DL (ref 0.1–1)
BUN BLD-MCNC: 13 MG/DL (ref 5–21)
BUN/CREAT SERPL: 15.7 (ref 7–25)
CALCIUM SPEC-SCNC: 8.6 MG/DL (ref 8.4–10.4)
CHLORIDE SERPL-SCNC: 106 MMOL/L (ref 98–110)
CO2 SERPL-SCNC: 25 MMOL/L (ref 24–31)
CREAT BLD-MCNC: 0.83 MG/DL (ref 0.5–1.4)
DEPRECATED RDW RBC AUTO: 58.7 FL (ref 37–54)
EOSINOPHIL # BLD AUTO: 0.16 10*3/MM3 (ref 0–0.4)
EOSINOPHIL NFR BLD AUTO: 2.2 % (ref 0.3–6.2)
ERYTHROCYTE [DISTWIDTH] IN BLOOD BY AUTOMATED COUNT: 21.6 % (ref 12.3–15.4)
FERRITIN SERPL-MCNC: 4.51 NG/ML (ref 6.24–137)
FOLATE SERPL-MCNC: 11.3 NG/ML (ref 4.78–24.2)
GFR SERPL CREATININE-BSD FRML MDRD: 74 ML/MIN/1.73
GLOBULIN UR ELPH-MCNC: 3 GM/DL
GLUCOSE BLD-MCNC: 128 MG/DL (ref 70–100)
HCT VFR BLD AUTO: 31.2 % (ref 34–46.6)
HGB BLD-MCNC: 9.4 G/DL (ref 12–15.9)
IMM GRANULOCYTES # BLD AUTO: 0.02 10*3/MM3 (ref 0–0.05)
IMM GRANULOCYTES NFR BLD AUTO: 0.3 % (ref 0–0.5)
IRON 24H UR-MRATE: 23 MCG/DL (ref 42–180)
IRON SATN MFR SERPL: 5 % (ref 20–45)
LYMPHOCYTES # BLD AUTO: 1.66 10*3/MM3 (ref 0.7–3.1)
LYMPHOCYTES NFR BLD AUTO: 23.2 % (ref 19.6–45.3)
MCH RBC QN AUTO: 23.2 PG (ref 26.6–33)
MCHC RBC AUTO-ENTMCNC: 30.1 G/DL (ref 31.5–35.7)
MCV RBC AUTO: 76.8 FL (ref 79–97)
MONOCYTES # BLD AUTO: 0.45 10*3/MM3 (ref 0.1–0.9)
MONOCYTES NFR BLD AUTO: 6.3 % (ref 5–12)
NEUTROPHILS # BLD AUTO: 4.8 10*3/MM3 (ref 1.7–7)
NEUTROPHILS NFR BLD AUTO: 67.2 % (ref 42.7–76)
NRBC BLD AUTO-RTO: 0 /100 WBC (ref 0–0.2)
PLATELET # BLD AUTO: 292 10*3/MM3 (ref 140–450)
PMV BLD AUTO: 10 FL (ref 6–12)
POTASSIUM BLD-SCNC: 4 MMOL/L (ref 3.5–5.3)
PROT SERPL-MCNC: 6.9 G/DL (ref 6.3–8.7)
RBC # BLD AUTO: 4.06 10*6/MM3 (ref 3.77–5.28)
SODIUM BLD-SCNC: 138 MMOL/L (ref 135–145)
TIBC SERPL-MCNC: 474 MCG/DL (ref 225–420)
VIT B12 BLD-MCNC: 210 PG/ML (ref 239–931)
WBC NRBC COR # BLD: 7.15 10*3/MM3 (ref 3.4–10.8)

## 2019-08-21 PROCEDURE — 99205 OFFICE O/P NEW HI 60 MIN: CPT | Performed by: INTERNAL MEDICINE

## 2019-08-21 PROCEDURE — 36415 COLL VENOUS BLD VENIPUNCTURE: CPT | Performed by: INTERNAL MEDICINE

## 2019-08-21 PROCEDURE — 80053 COMPREHEN METABOLIC PANEL: CPT | Performed by: INTERNAL MEDICINE

## 2019-08-21 PROCEDURE — 82728 ASSAY OF FERRITIN: CPT | Performed by: INTERNAL MEDICINE

## 2019-08-21 PROCEDURE — 85025 COMPLETE CBC W/AUTO DIFF WBC: CPT | Performed by: INTERNAL MEDICINE

## 2019-08-21 PROCEDURE — 82607 VITAMIN B-12: CPT | Performed by: INTERNAL MEDICINE

## 2019-08-21 PROCEDURE — 83540 ASSAY OF IRON: CPT | Performed by: INTERNAL MEDICINE

## 2019-08-21 PROCEDURE — 82746 ASSAY OF FOLIC ACID SERUM: CPT | Performed by: INTERNAL MEDICINE

## 2019-08-21 PROCEDURE — 83550 IRON BINDING TEST: CPT | Performed by: INTERNAL MEDICINE

## 2019-08-21 RX ORDER — SODIUM CHLORIDE 9 MG/ML
250 INJECTION, SOLUTION INTRAVENOUS ONCE
Status: CANCELLED | OUTPATIENT
Start: 2019-08-28

## 2019-08-21 RX ORDER — DIPHENHYDRAMINE HYDROCHLORIDE 50 MG/ML
50 INJECTION INTRAMUSCULAR; INTRAVENOUS AS NEEDED
Status: CANCELLED | OUTPATIENT
Start: 2019-09-04

## 2019-08-21 RX ORDER — FAMOTIDINE 10 MG/ML
20 INJECTION, SOLUTION INTRAVENOUS AS NEEDED
Status: CANCELLED | OUTPATIENT
Start: 2019-09-04

## 2019-08-21 RX ORDER — SODIUM CHLORIDE 9 MG/ML
250 INJECTION, SOLUTION INTRAVENOUS ONCE
Status: CANCELLED | OUTPATIENT
Start: 2019-09-04

## 2019-08-21 RX ORDER — DIPHENHYDRAMINE HYDROCHLORIDE 50 MG/ML
50 INJECTION INTRAMUSCULAR; INTRAVENOUS AS NEEDED
Status: CANCELLED | OUTPATIENT
Start: 2019-08-28

## 2019-08-21 RX ORDER — FAMOTIDINE 10 MG/ML
20 INJECTION, SOLUTION INTRAVENOUS AS NEEDED
Status: CANCELLED | OUTPATIENT
Start: 2019-08-28

## 2019-08-21 NOTE — PROGRESS NOTES
[facility]  HEMATOLOGY & ONCOLOGY        Subjective     VISIT DIAGNOSIS: No diagnosis found.    REASON FOR VISIT:     Chief Complaint   Patient presents with   • Anemia        HEMATOLOGY / ONCOLOGY HISTORY:    No history exists.     [No treatment plan]  Cancer Staging Information:  Cancer Staging  No matching staging information was found for the patient.      INTERVAL HISTORY  Patient ID: Gaviota Casiano is a 44 y.o. year old female  Hx sig for gastric bypass referred for ARASH. She has been anemic all her life. Recently worse. She had a blood transfusion when she had her daughter. She is on iron patch. c-scope and egd 2017 unremarkable for bleed.  -Denies fhx of leukemia or lymphoma.  -denies n/v, sob, cp, abdominal pain,dizziness, melena, hematuria.       Review of Systems     See above. otherwise neg    Medications:    Current Outpatient Medications   Medication Sig Dispense Refill   • Cyanocobalamin (B-12 COMPLIANCE INJECTION IJ) Inject 1 dose as directed Every 30 (Thirty) Days.     • ferrous sulfate 325 (65 FE) MG tablet Take 325 mg by mouth Daily With Breakfast.     • levothyroxine (SYNTHROID, LEVOTHROID) 112 MCG tablet Take 112 mcg by mouth Daily.     • lisinopril (PRINIVIL,ZESTRIL) 10 MG tablet      • omeprazole (priLOSEC) 20 MG capsule Take 1 capsule by mouth Daily. 30 capsule 11     No current facility-administered medications for this visit.        ALLERGIES:    Allergies   Allergen Reactions   • Darvon [Propoxyphene] Hives     Associated with Darvocet which has been pulled from shelve.   • Penicillins Hives   • Percocet [Oxycodone-Acetaminophen] Hives       Objective      Vitals:    08/21/19 1305   BP: 132/84   Pulse: 75   Resp: 16   Temp: 97.7 °F (36.5 °C)   SpO2: 100%       Current Status 8/21/2019   ECOG score 0       General Appearance: unremarkable.Patient is awake, alert, oriented and in no acute distress. Patient is welldeveloped, wellnourished, and appears stated age.  HEENT:  Normocephalic. Sclerae clear, conjunctiva pink, extraocular movements intact, pupils, round, reactive to light and  accommodation. Mouth and throat are clear with moist oral mucosa.  NECK: Supple, no jugular venous distention, thyroid not enlarged.  LYMPH: No cervical, supraclavicular, axillary, or inguinal lymphadenopathy.  CHEST: Equal bilateral expansion, AP  diameter normal, resonant percussion note  LUNGS: Good air movement, no rales, rhonchi, rubs or wheezes with auscultation  CARDIO: Regular sinus rhythm, no murmurs, gallops or rubs.  ABDOMEN: Nondistended, soft, No tenderness, no guarding, no rebound, No hepatosplenomegaly. No abdominal masses. Bowel sounds positive. No hernia  GENITALIA: Not examined.  BREASTS: Not examined.  MUSKEL: No joint swelling, decreased motion, or inflammation  EXTREMS: No edema, clubbing, cyanosis, No varicose veins.  NEURO: Grossly nonfocal, Gait is coordinated and smooth, Cognition is preserved.  SKIN: No rashes, no ecchymoses, no petechia.  PSYCH: Oriented to time, place and person. Memory is preserved. Mood and affect appear normal      RECENT LABS:  Orders Only on 08/19/2019   Component Date Value Ref Range Status   • Glucose 08/21/2019 128* 70 - 100 mg/dL Final   • BUN 08/21/2019 13  5 - 21 mg/dL Final   • Creatinine 08/21/2019 0.83  0.50 - 1.40 mg/dL Final   • Sodium 08/21/2019 138  135 - 145 mmol/L Final   • Potassium 08/21/2019 4.0  3.5 - 5.3 mmol/L Final   • Chloride 08/21/2019 106  98 - 110 mmol/L Final   • CO2 08/21/2019 25.0  24.0 - 31.0 mmol/L Final   • Calcium 08/21/2019 8.6  8.4 - 10.4 mg/dL Final   • Total Protein 08/21/2019 6.9  6.3 - 8.7 g/dL Final   • Albumin 08/21/2019 3.90  3.50 - 5.00 g/dL Final   • ALT (SGPT) 08/21/2019 26  0 - 54 U/L Final   • AST (SGOT) 08/21/2019 26  7 - 45 U/L Final   • Alkaline Phosphatase 08/21/2019 70  24 - 120 U/L Final   • Total Bilirubin 08/21/2019 0.3  0.1 - 1.0 mg/dL Final   • eGFR Non African Amer 08/21/2019 74  >60 mL/min/1.73  Final   • Globulin 08/21/2019 3.0  gm/dL Final   • A/G Ratio 08/21/2019 1.3  1.1 - 2.5 g/dL Final   • BUN/Creatinine Ratio 08/21/2019 15.7  7.0 - 25.0 Final   • Anion Gap 08/21/2019 7.0  4.0 - 13.0 mmol/L Final   • Iron 08/21/2019 23* 42 - 180 mcg/dL Final   • TIBC 08/21/2019 474* 225 - 420 mcg/dL Final   • Iron Saturation 08/21/2019 5* 20 - 45 % Final   • WBC 08/21/2019 7.15  3.40 - 10.80 10*3/mm3 Final   • RBC 08/21/2019 4.06  3.77 - 5.28 10*6/mm3 Final   • Hemoglobin 08/21/2019 9.4* 12.0 - 15.9 g/dL Final   • Hematocrit 08/21/2019 31.2* 34.0 - 46.6 % Final   • MCV 08/21/2019 76.8* 79.0 - 97.0 fL Final   • MCH 08/21/2019 23.2* 26.6 - 33.0 pg Final   • MCHC 08/21/2019 30.1* 31.5 - 35.7 g/dL Final   • RDW 08/21/2019 21.6* 12.3 - 15.4 % Final   • RDW-SD 08/21/2019 58.7* 37.0 - 54.0 fl Final   • MPV 08/21/2019 10.0  6.0 - 12.0 fL Final   • Platelets 08/21/2019 292  140 - 450 10*3/mm3 Final   • Neutrophil % 08/21/2019 67.2  42.7 - 76.0 % Final   • Lymphocyte % 08/21/2019 23.2  19.6 - 45.3 % Final   • Monocyte % 08/21/2019 6.3  5.0 - 12.0 % Final   • Eosinophil % 08/21/2019 2.2  0.3 - 6.2 % Final   • Basophil % 08/21/2019 0.8  0.0 - 1.5 % Final   • Immature Grans % 08/21/2019 0.3  0.0 - 0.5 % Final   • Neutrophils, Absolute 08/21/2019 4.80  1.70 - 7.00 10*3/mm3 Final   • Lymphocytes, Absolute 08/21/2019 1.66  0.70 - 3.10 10*3/mm3 Final   • Monocytes, Absolute 08/21/2019 0.45  0.10 - 0.90 10*3/mm3 Final   • Eosinophils, Absolute 08/21/2019 0.16  0.00 - 0.40 10*3/mm3 Final   • Basophils, Absolute 08/21/2019 0.06  0.00 - 0.20 10*3/mm3 Final   • Immature Grans, Absolute 08/21/2019 0.02  0.00 - 0.05 10*3/mm3 Final   • nRBC 08/21/2019 0.0  0.0 - 0.2 /100 WBC Final       RADIOLOGY:  No results found.         Assessment/Plan 45 yo female with hx sig for gastric bypass with low iron due to malabsorption.    1. Anemia: iron 23, TIBC 474, %sat 5. On ferrous sulfate  -c-scoape 5/11/17 showed A 5 mm polyp was found in the transverse  colon. The polyp was sessile. EGD Evidence of a Behzad-en-Y gastrojejunostomy was found with normal esophagus  -will give injectafer    2. Hypothyroidiam: on synthroid    3. Low B12: on b12 shots monthly    4. HTN: on lisinopril  5. Gerd: on omeprazole       Time Spent: 60 minutes; greater than  50% of time was spent in patient counseling and care coordination.     Heath Bender MD    8/21/2019    1:13 PM

## 2019-08-22 ENCOUNTER — TELEPHONE (OUTPATIENT)
Dept: ONCOLOGY | Facility: CLINIC | Age: 44
End: 2019-08-22

## 2019-08-22 DIAGNOSIS — E53.8 VITAMIN B12 DEFICIENCY: ICD-10-CM

## 2019-08-22 RX ORDER — CYANOCOBALAMIN 1000 UG/ML
1000 INJECTION, SOLUTION INTRAMUSCULAR; SUBCUTANEOUS ONCE
OUTPATIENT
Start: 2019-11-13

## 2019-08-22 RX ORDER — CYANOCOBALAMIN 1000 UG/ML
1000 INJECTION, SOLUTION INTRAMUSCULAR; SUBCUTANEOUS ONCE
OUTPATIENT
Start: 2019-10-16

## 2019-08-22 RX ORDER — CYANOCOBALAMIN 1000 UG/ML
1000 INJECTION, SOLUTION INTRAMUSCULAR; SUBCUTANEOUS ONCE
Status: CANCELLED | OUTPATIENT
Start: 2019-08-28

## 2019-08-22 RX ORDER — CYANOCOBALAMIN 1000 UG/ML
1000 INJECTION, SOLUTION INTRAMUSCULAR; SUBCUTANEOUS ONCE
Status: CANCELLED | OUTPATIENT
Start: 2019-09-04

## 2019-08-22 RX ORDER — CYANOCOBALAMIN 1000 UG/ML
1000 INJECTION, SOLUTION INTRAMUSCULAR; SUBCUTANEOUS ONCE
OUTPATIENT
Start: 2020-01-08

## 2019-08-22 RX ORDER — CYANOCOBALAMIN 1000 UG/ML
1000 INJECTION, SOLUTION INTRAMUSCULAR; SUBCUTANEOUS ONCE
Status: CANCELLED | OUTPATIENT
Start: 2019-09-18

## 2019-08-22 RX ORDER — CYANOCOBALAMIN 1000 UG/ML
1000 INJECTION, SOLUTION INTRAMUSCULAR; SUBCUTANEOUS ONCE
OUTPATIENT
Start: 2019-12-11

## 2019-08-22 RX ORDER — CYANOCOBALAMIN 1000 UG/ML
1000 INJECTION, SOLUTION INTRAMUSCULAR; SUBCUTANEOUS ONCE
Status: CANCELLED | OUTPATIENT
Start: 2019-09-11

## 2019-08-22 NOTE — TELEPHONE ENCOUNTER
----- Message from Heath Bender MD sent at 8/22/2019 10:48 AM CDT -----  Please call the patient regarding her abnormal result. Need b12 shots. Start with weekly then monthly. tnx

## 2019-08-22 NOTE — TELEPHONE ENCOUNTER
Left message for patient that her B12 level is low and that Dr. Bender wants her to get B12 injections weekly x4 then once a month and that it will start on the 28th.

## 2019-08-28 ENCOUNTER — INFUSION (OUTPATIENT)
Dept: ONCOLOGY | Facility: HOSPITAL | Age: 44
End: 2019-08-28

## 2019-08-28 VITALS
OXYGEN SATURATION: 100 % | BODY MASS INDEX: 39.87 KG/M2 | HEART RATE: 77 BPM | WEIGHT: 254 LBS | HEIGHT: 67 IN | RESPIRATION RATE: 18 BRPM | DIASTOLIC BLOOD PRESSURE: 81 MMHG | TEMPERATURE: 97.6 F | SYSTOLIC BLOOD PRESSURE: 134 MMHG

## 2019-08-28 DIAGNOSIS — D50.8 OTHER IRON DEFICIENCY ANEMIA: Primary | ICD-10-CM

## 2019-08-28 DIAGNOSIS — E53.8 VITAMIN B12 DEFICIENCY: ICD-10-CM

## 2019-08-28 PROCEDURE — 25010000002 FERRIC CARBOXYMALTOSE 750 MG/15ML SOLUTION 15 ML VIAL: Performed by: INTERNAL MEDICINE

## 2019-08-28 PROCEDURE — 96365 THER/PROPH/DIAG IV INF INIT: CPT

## 2019-08-28 PROCEDURE — 96372 THER/PROPH/DIAG INJ SC/IM: CPT

## 2019-08-28 PROCEDURE — 25010000002 CYANOCOBALAMIN PER 1000 MCG: Performed by: INTERNAL MEDICINE

## 2019-08-28 RX ORDER — SODIUM CHLORIDE 9 MG/ML
250 INJECTION, SOLUTION INTRAVENOUS ONCE
Status: COMPLETED | OUTPATIENT
Start: 2019-08-28 | End: 2019-08-28

## 2019-08-28 RX ORDER — DIPHENHYDRAMINE HYDROCHLORIDE 50 MG/ML
50 INJECTION INTRAMUSCULAR; INTRAVENOUS AS NEEDED
Status: DISCONTINUED | OUTPATIENT
Start: 2019-08-28 | End: 2019-08-28 | Stop reason: HOSPADM

## 2019-08-28 RX ORDER — FAMOTIDINE 10 MG/ML
20 INJECTION, SOLUTION INTRAVENOUS AS NEEDED
Status: DISCONTINUED | OUTPATIENT
Start: 2019-08-28 | End: 2019-08-28 | Stop reason: HOSPADM

## 2019-08-28 RX ORDER — CYANOCOBALAMIN 1000 UG/ML
1000 INJECTION, SOLUTION INTRAMUSCULAR; SUBCUTANEOUS ONCE
Status: COMPLETED | OUTPATIENT
Start: 2019-08-28 | End: 2019-08-28

## 2019-08-28 RX ADMIN — FERRIC CARBOXYMALTOSE INJECTION 750 MG: 50 INJECTION, SOLUTION INTRAVENOUS at 13:19

## 2019-08-28 RX ADMIN — SODIUM CHLORIDE 250 ML: 9 INJECTION, SOLUTION INTRAVENOUS at 13:19

## 2019-08-28 RX ADMIN — CYANOCOBALAMIN 1000 MCG: 1000 INJECTION, SOLUTION INTRAMUSCULAR at 13:53

## 2019-09-04 ENCOUNTER — INFUSION (OUTPATIENT)
Dept: ONCOLOGY | Facility: HOSPITAL | Age: 44
End: 2019-09-04

## 2019-09-04 VITALS
TEMPERATURE: 98.2 F | BODY MASS INDEX: 38.34 KG/M2 | DIASTOLIC BLOOD PRESSURE: 84 MMHG | OXYGEN SATURATION: 100 % | SYSTOLIC BLOOD PRESSURE: 147 MMHG | RESPIRATION RATE: 16 BRPM | WEIGHT: 253 LBS | HEIGHT: 68 IN | HEART RATE: 69 BPM

## 2019-09-04 DIAGNOSIS — E53.8 VITAMIN B12 DEFICIENCY: ICD-10-CM

## 2019-09-04 DIAGNOSIS — D50.8 OTHER IRON DEFICIENCY ANEMIA: Primary | ICD-10-CM

## 2019-09-04 PROCEDURE — 96365 THER/PROPH/DIAG IV INF INIT: CPT

## 2019-09-04 PROCEDURE — 25010000002 FERRIC CARBOXYMALTOSE 750 MG/15ML SOLUTION 15 ML VIAL: Performed by: INTERNAL MEDICINE

## 2019-09-04 PROCEDURE — 96372 THER/PROPH/DIAG INJ SC/IM: CPT

## 2019-09-04 PROCEDURE — 25010000002 CYANOCOBALAMIN PER 1000 MCG: Performed by: INTERNAL MEDICINE

## 2019-09-04 RX ORDER — FAMOTIDINE 10 MG/ML
20 INJECTION, SOLUTION INTRAVENOUS AS NEEDED
Status: DISCONTINUED | OUTPATIENT
Start: 2019-09-04 | End: 2019-09-04 | Stop reason: HOSPADM

## 2019-09-04 RX ORDER — CYANOCOBALAMIN 1000 UG/ML
1000 INJECTION, SOLUTION INTRAMUSCULAR; SUBCUTANEOUS ONCE
Status: COMPLETED | OUTPATIENT
Start: 2019-09-04 | End: 2019-09-04

## 2019-09-04 RX ORDER — SODIUM CHLORIDE 9 MG/ML
250 INJECTION, SOLUTION INTRAVENOUS ONCE
Status: COMPLETED | OUTPATIENT
Start: 2019-09-04 | End: 2019-09-04

## 2019-09-04 RX ORDER — DIPHENHYDRAMINE HYDROCHLORIDE 50 MG/ML
50 INJECTION INTRAMUSCULAR; INTRAVENOUS AS NEEDED
Status: DISCONTINUED | OUTPATIENT
Start: 2019-09-04 | End: 2019-09-04 | Stop reason: HOSPADM

## 2019-09-04 RX ADMIN — FERRIC CARBOXYMALTOSE INJECTION 750 MG: 50 INJECTION, SOLUTION INTRAVENOUS at 13:16

## 2019-09-04 RX ADMIN — CYANOCOBALAMIN 1000 MCG: 1000 INJECTION, SOLUTION INTRAMUSCULAR at 14:02

## 2019-09-04 RX ADMIN — SODIUM CHLORIDE 250 ML: 9 INJECTION, SOLUTION INTRAVENOUS at 13:05

## 2019-09-11 ENCOUNTER — INFUSION (OUTPATIENT)
Dept: ONCOLOGY | Facility: HOSPITAL | Age: 44
End: 2019-09-11

## 2019-09-11 VITALS
HEIGHT: 68 IN | HEART RATE: 58 BPM | OXYGEN SATURATION: 100 % | RESPIRATION RATE: 20 BRPM | SYSTOLIC BLOOD PRESSURE: 133 MMHG | TEMPERATURE: 98.4 F | DIASTOLIC BLOOD PRESSURE: 83 MMHG | BODY MASS INDEX: 38.42 KG/M2 | WEIGHT: 253.53 LBS

## 2019-09-11 DIAGNOSIS — E53.8 VITAMIN B12 DEFICIENCY: Primary | ICD-10-CM

## 2019-09-11 PROCEDURE — 25010000002 CYANOCOBALAMIN PER 1000 MCG: Performed by: INTERNAL MEDICINE

## 2019-09-11 PROCEDURE — 96372 THER/PROPH/DIAG INJ SC/IM: CPT

## 2019-09-11 RX ORDER — CYANOCOBALAMIN 1000 UG/ML
1000 INJECTION, SOLUTION INTRAMUSCULAR; SUBCUTANEOUS ONCE
Status: COMPLETED | OUTPATIENT
Start: 2019-09-11 | End: 2019-09-11

## 2019-09-11 RX ADMIN — CYANOCOBALAMIN 1000 MCG: 1000 INJECTION, SOLUTION INTRAMUSCULAR at 13:14

## 2019-09-18 ENCOUNTER — INFUSION (OUTPATIENT)
Dept: ONCOLOGY | Facility: HOSPITAL | Age: 44
End: 2019-09-18

## 2019-09-18 VITALS
SYSTOLIC BLOOD PRESSURE: 120 MMHG | HEIGHT: 68 IN | BODY MASS INDEX: 38.49 KG/M2 | HEART RATE: 64 BPM | TEMPERATURE: 98.5 F | DIASTOLIC BLOOD PRESSURE: 80 MMHG | RESPIRATION RATE: 16 BRPM | OXYGEN SATURATION: 100 % | WEIGHT: 254 LBS

## 2019-09-18 DIAGNOSIS — E53.8 VITAMIN B12 DEFICIENCY: Primary | ICD-10-CM

## 2019-09-18 PROCEDURE — 25010000002 CYANOCOBALAMIN PER 1000 MCG: Performed by: INTERNAL MEDICINE

## 2019-09-18 PROCEDURE — 96372 THER/PROPH/DIAG INJ SC/IM: CPT

## 2019-09-18 RX ORDER — CYANOCOBALAMIN 1000 UG/ML
1000 INJECTION, SOLUTION INTRAMUSCULAR; SUBCUTANEOUS ONCE
Status: COMPLETED | OUTPATIENT
Start: 2019-09-18 | End: 2019-09-18

## 2019-09-18 RX ADMIN — CYANOCOBALAMIN 1000 MCG: 1000 INJECTION, SOLUTION INTRAMUSCULAR at 11:16

## 2019-10-18 DIAGNOSIS — E53.8 VITAMIN B12 DEFICIENCY: Primary | ICD-10-CM

## 2019-10-24 ENCOUNTER — OFFICE VISIT (OUTPATIENT)
Dept: ONCOLOGY | Facility: CLINIC | Age: 44
End: 2019-10-24

## 2019-10-24 ENCOUNTER — APPOINTMENT (OUTPATIENT)
Dept: LAB | Facility: HOSPITAL | Age: 44
End: 2019-10-24

## 2019-10-24 VITALS
SYSTOLIC BLOOD PRESSURE: 115 MMHG | BODY MASS INDEX: 38.95 KG/M2 | HEIGHT: 68 IN | RESPIRATION RATE: 16 BRPM | WEIGHT: 257 LBS | TEMPERATURE: 97.9 F | HEART RATE: 70 BPM | OXYGEN SATURATION: 98 % | DIASTOLIC BLOOD PRESSURE: 88 MMHG

## 2019-10-24 DIAGNOSIS — R21 RASH: ICD-10-CM

## 2019-10-24 DIAGNOSIS — E53.8 VITAMIN B12 DEFICIENCY: Primary | ICD-10-CM

## 2019-10-24 DIAGNOSIS — R00.1 BRADYCARDIA: Primary | ICD-10-CM

## 2019-10-24 LAB
ALBUMIN SERPL-MCNC: 4 G/DL (ref 3.5–5.2)
ALBUMIN/GLOB SERPL: 2.2 G/DL
ALP SERPL-CCNC: 72 U/L (ref 39–117)
ALT SERPL W P-5'-P-CCNC: 21 U/L (ref 1–33)
ANION GAP SERPL CALCULATED.3IONS-SCNC: 9 MMOL/L (ref 5–15)
AST SERPL-CCNC: 18 U/L (ref 1–32)
BASOPHILS # BLD AUTO: 0.04 10*3/MM3 (ref 0–0.2)
BASOPHILS NFR BLD AUTO: 0.7 % (ref 0–1.5)
BILIRUB SERPL-MCNC: 0.2 MG/DL (ref 0.2–1.2)
BUN BLD-MCNC: 16 MG/DL (ref 6–20)
BUN/CREAT SERPL: 23.2 (ref 7–25)
CALCIUM SPEC-SCNC: 8.9 MG/DL (ref 8.6–10.5)
CHLORIDE SERPL-SCNC: 105 MMOL/L (ref 98–107)
CO2 SERPL-SCNC: 28 MMOL/L (ref 22–29)
CREAT BLD-MCNC: 0.69 MG/DL (ref 0.57–1)
DEPRECATED RDW RBC AUTO: 66.9 FL (ref 37–54)
EOSINOPHIL # BLD AUTO: 0.18 10*3/MM3 (ref 0–0.4)
EOSINOPHIL NFR BLD AUTO: 3 % (ref 0.3–6.2)
ERYTHROCYTE [DISTWIDTH] IN BLOOD BY AUTOMATED COUNT: 21.2 % (ref 12.3–15.4)
GFR SERPL CREATININE-BSD FRML MDRD: 92 ML/MIN/1.73
GLOBULIN UR ELPH-MCNC: 1.8 GM/DL
GLUCOSE BLD-MCNC: 90 MG/DL (ref 65–99)
HCT VFR BLD AUTO: 40 % (ref 34–46.6)
HGB BLD-MCNC: 13.6 G/DL (ref 12–15.9)
HOLD SPECIMEN: NORMAL
IMM GRANULOCYTES # BLD AUTO: 0.03 10*3/MM3 (ref 0–0.05)
IMM GRANULOCYTES NFR BLD AUTO: 0.5 % (ref 0–0.5)
LYMPHOCYTES # BLD AUTO: 1.56 10*3/MM3 (ref 0.7–3.1)
LYMPHOCYTES NFR BLD AUTO: 26 % (ref 19.6–45.3)
MCH RBC QN AUTO: 30.2 PG (ref 26.6–33)
MCHC RBC AUTO-ENTMCNC: 34 G/DL (ref 31.5–35.7)
MCV RBC AUTO: 88.7 FL (ref 79–97)
MONOCYTES # BLD AUTO: 0.33 10*3/MM3 (ref 0.1–0.9)
MONOCYTES NFR BLD AUTO: 5.5 % (ref 5–12)
NEUTROPHILS # BLD AUTO: 3.85 10*3/MM3 (ref 1.7–7)
NEUTROPHILS NFR BLD AUTO: 64.3 % (ref 42.7–76)
NRBC BLD AUTO-RTO: 0 /100 WBC (ref 0–0.2)
PLATELET # BLD AUTO: 245 10*3/MM3 (ref 140–450)
PMV BLD AUTO: 9.6 FL (ref 6–12)
POTASSIUM BLD-SCNC: 4.5 MMOL/L (ref 3.5–5.2)
PROT SERPL-MCNC: 5.8 G/DL (ref 6–8.5)
RBC # BLD AUTO: 4.51 10*6/MM3 (ref 3.77–5.28)
SODIUM BLD-SCNC: 142 MMOL/L (ref 136–145)
WBC NRBC COR # BLD: 5.99 10*3/MM3 (ref 3.4–10.8)

## 2019-10-24 PROCEDURE — 36415 COLL VENOUS BLD VENIPUNCTURE: CPT | Performed by: INTERNAL MEDICINE

## 2019-10-24 PROCEDURE — 80053 COMPREHEN METABOLIC PANEL: CPT | Performed by: INTERNAL MEDICINE

## 2019-10-24 PROCEDURE — 99214 OFFICE O/P EST MOD 30 MIN: CPT | Performed by: INTERNAL MEDICINE

## 2019-10-24 PROCEDURE — 85025 COMPLETE CBC W/AUTO DIFF WBC: CPT | Performed by: INTERNAL MEDICINE

## 2019-10-24 RX ORDER — LEVOTHYROXINE SODIUM 125 MCG
TABLET ORAL
Refills: 3 | COMMUNITY
Start: 2019-10-07 | End: 2022-09-12 | Stop reason: DRUGHIGH

## 2019-11-27 ENCOUNTER — TRANSCRIBE ORDERS (OUTPATIENT)
Dept: GENERAL RADIOLOGY | Facility: HOSPITAL | Age: 44
End: 2019-11-27

## 2019-11-27 ENCOUNTER — HOSPITAL ENCOUNTER (OUTPATIENT)
Dept: GENERAL RADIOLOGY | Facility: HOSPITAL | Age: 44
Discharge: HOME | End: 2019-11-27
Admitting: NURSE PRACTITIONER

## 2019-11-27 DIAGNOSIS — R50.9 FEVER OF UNKNOWN ORIGIN: ICD-10-CM

## 2019-11-27 DIAGNOSIS — R05.9 COUGH: ICD-10-CM

## 2019-11-27 DIAGNOSIS — R50.9 FEVER OF UNKNOWN ORIGIN: Primary | ICD-10-CM

## 2019-11-27 PROCEDURE — 71046 X-RAY EXAM CHEST 2 VIEWS: CPT

## 2020-01-23 ENCOUNTER — TRANSCRIBE ORDERS (OUTPATIENT)
Dept: ADMINISTRATIVE | Facility: HOSPITAL | Age: 45
End: 2020-01-23

## 2020-01-23 ENCOUNTER — LAB (OUTPATIENT)
Dept: LAB | Facility: HOSPITAL | Age: 45
End: 2020-01-23

## 2020-01-23 ENCOUNTER — APPOINTMENT (OUTPATIENT)
Dept: LAB | Facility: HOSPITAL | Age: 45
End: 2020-01-23

## 2020-01-23 ENCOUNTER — OFFICE VISIT (OUTPATIENT)
Dept: ONCOLOGY | Facility: CLINIC | Age: 45
End: 2020-01-23

## 2020-01-23 VITALS
HEART RATE: 67 BPM | BODY MASS INDEX: 40.28 KG/M2 | TEMPERATURE: 97.3 F | HEIGHT: 68 IN | OXYGEN SATURATION: 98 % | WEIGHT: 265.8 LBS | RESPIRATION RATE: 16 BRPM | SYSTOLIC BLOOD PRESSURE: 121 MMHG | DIASTOLIC BLOOD PRESSURE: 65 MMHG

## 2020-01-23 DIAGNOSIS — E03.9 HYPOTHYROIDISM, UNSPECIFIED TYPE: Primary | ICD-10-CM

## 2020-01-23 DIAGNOSIS — E53.8 VITAMIN B12 DEFICIENCY: ICD-10-CM

## 2020-01-23 DIAGNOSIS — D50.8 OTHER IRON DEFICIENCY ANEMIA: Primary | ICD-10-CM

## 2020-01-23 LAB
ALBUMIN SERPL-MCNC: 4.1 G/DL (ref 3.5–5.2)
ALBUMIN/GLOB SERPL: 1.5 G/DL
ALP SERPL-CCNC: 75 U/L (ref 39–117)
ALT SERPL W P-5'-P-CCNC: 21 U/L (ref 1–33)
ANION GAP SERPL CALCULATED.3IONS-SCNC: 9 MMOL/L (ref 5–15)
AST SERPL-CCNC: 20 U/L (ref 1–32)
BASOPHILS # BLD AUTO: 0.05 10*3/MM3 (ref 0–0.2)
BASOPHILS NFR BLD AUTO: 0.7 % (ref 0–1.5)
BILIRUB SERPL-MCNC: 0.2 MG/DL (ref 0.2–1.2)
BUN BLD-MCNC: 18 MG/DL (ref 6–20)
BUN/CREAT SERPL: 17.8 (ref 7–25)
CALCIUM SPEC-SCNC: 8.9 MG/DL (ref 8.6–10.5)
CHLORIDE SERPL-SCNC: 107 MMOL/L (ref 98–107)
CO2 SERPL-SCNC: 23 MMOL/L (ref 22–29)
CREAT BLD-MCNC: 1.01 MG/DL (ref 0.57–1)
DEPRECATED RDW RBC AUTO: 46 FL (ref 37–54)
EOSINOPHIL # BLD AUTO: 0.18 10*3/MM3 (ref 0–0.4)
EOSINOPHIL NFR BLD AUTO: 2.5 % (ref 0.3–6.2)
ERYTHROCYTE [DISTWIDTH] IN BLOOD BY AUTOMATED COUNT: 13 % (ref 12.3–15.4)
FERRITIN SERPL-MCNC: 18.23 NG/ML (ref 13–150)
GFR SERPL CREATININE-BSD FRML MDRD: 59 ML/MIN/1.73
GLOBULIN UR ELPH-MCNC: 2.7 GM/DL
GLUCOSE BLD-MCNC: 81 MG/DL (ref 65–99)
HCT VFR BLD AUTO: 39.3 % (ref 34–46.6)
HGB BLD-MCNC: 13.7 G/DL (ref 12–15.9)
HOLD SPECIMEN: NORMAL
HOLD SPECIMEN: NORMAL
IMM GRANULOCYTES # BLD AUTO: 0.02 10*3/MM3 (ref 0–0.05)
IMM GRANULOCYTES NFR BLD AUTO: 0.3 % (ref 0–0.5)
IRON 24H UR-MRATE: 60 MCG/DL (ref 37–145)
IRON SATN MFR SERPL: 15 % (ref 20–50)
LYMPHOCYTES # BLD AUTO: 1.52 10*3/MM3 (ref 0.7–3.1)
LYMPHOCYTES NFR BLD AUTO: 20.8 % (ref 19.6–45.3)
MCH RBC QN AUTO: 33.6 PG (ref 26.6–33)
MCHC RBC AUTO-ENTMCNC: 34.9 G/DL (ref 31.5–35.7)
MCV RBC AUTO: 96.3 FL (ref 79–97)
MONOCYTES # BLD AUTO: 0.53 10*3/MM3 (ref 0.1–0.9)
MONOCYTES NFR BLD AUTO: 7.3 % (ref 5–12)
NEUTROPHILS # BLD AUTO: 5.01 10*3/MM3 (ref 1.7–7)
NEUTROPHILS NFR BLD AUTO: 68.4 % (ref 42.7–76)
NRBC BLD AUTO-RTO: 0 /100 WBC (ref 0–0.2)
PLATELET # BLD AUTO: 282 10*3/MM3 (ref 140–450)
PMV BLD AUTO: 9.7 FL (ref 6–12)
POTASSIUM BLD-SCNC: 4.1 MMOL/L (ref 3.5–5.2)
PROT SERPL-MCNC: 6.8 G/DL (ref 6–8.5)
RBC # BLD AUTO: 4.08 10*6/MM3 (ref 3.77–5.28)
SODIUM BLD-SCNC: 139 MMOL/L (ref 136–145)
T4 FREE SERPL-MCNC: 1.15 NG/DL (ref 0.93–1.7)
TIBC SERPL-MCNC: 407 MCG/DL (ref 298–536)
TRANSFERRIN SERPL-MCNC: 273 MG/DL (ref 200–360)
TSH SERPL DL<=0.05 MIU/L-ACNC: 4.19 UIU/ML (ref 0.27–4.2)
WBC NRBC COR # BLD: 7.31 10*3/MM3 (ref 3.4–10.8)

## 2020-01-23 PROCEDURE — 83540 ASSAY OF IRON: CPT | Performed by: INTERNAL MEDICINE

## 2020-01-23 PROCEDURE — 82746 ASSAY OF FOLIC ACID SERUM: CPT | Performed by: INTERNAL MEDICINE

## 2020-01-23 PROCEDURE — 36415 COLL VENOUS BLD VENIPUNCTURE: CPT | Performed by: INTERNAL MEDICINE

## 2020-01-23 PROCEDURE — 82728 ASSAY OF FERRITIN: CPT | Performed by: INTERNAL MEDICINE

## 2020-01-23 PROCEDURE — 80053 COMPREHEN METABOLIC PANEL: CPT | Performed by: INTERNAL MEDICINE

## 2020-01-23 PROCEDURE — 85025 COMPLETE CBC W/AUTO DIFF WBC: CPT | Performed by: INTERNAL MEDICINE

## 2020-01-23 PROCEDURE — 82607 VITAMIN B-12: CPT | Performed by: INTERNAL MEDICINE

## 2020-01-23 PROCEDURE — 84439 ASSAY OF FREE THYROXINE: CPT

## 2020-01-23 PROCEDURE — 99214 OFFICE O/P EST MOD 30 MIN: CPT | Performed by: INTERNAL MEDICINE

## 2020-01-23 PROCEDURE — 84466 ASSAY OF TRANSFERRIN: CPT | Performed by: INTERNAL MEDICINE

## 2020-01-23 PROCEDURE — 84443 ASSAY THYROID STIM HORMONE: CPT

## 2020-01-23 NOTE — PROGRESS NOTES
[facility]  HEMATOLOGY & ONCOLOGY        Subjective     VISIT DIAGNOSIS:   Encounter Diagnoses   Name Primary?   • Other iron deficiency anemia Yes   • Vitamin B12 deficiency        REASON FOR VISIT:     Chief Complaint   Patient presents with   • Slow Heart Rate     here for 3 month follow up, no complaints         HEMATOLOGY / ONCOLOGY HISTORY:    No history exists.     [No treatment plan]  Cancer Staging Information:  Cancer Staging  No matching staging information was found for the patient.      INTERVAL HISTORY  Patient ID: Gaviota Casiano is a 44 y.o. year old female  Hx sig for gastric bypass referred for ARASH. She has been anemic all her life. Recently worse. She had a blood transfusion when she had her daughter. She is on iron patch. c-scope and egd 2017 unremarkable for bleed.    10/24/19: complaining of low pulse. She noticed her pulse in th 50s per her fit bit. Denies syncope, LOC, seizure  .1/23/2020: no new issues or concerns.  -denies n/v, sob, cp, abdominal pain,dizziness, melena, hematuria, n/v, abdominal pain night sweats or unintentional weight loss.      Review of Systems     See above. otherwise neg    Medications:    Current Outpatient Medications   Medication Sig Dispense Refill   • Cyanocobalamin (B-12 COMPLIANCE INJECTION IJ) Inject 1 dose as directed Every 30 (Thirty) Days.     • ferrous sulfate 325 (65 FE) MG tablet Take 325 mg by mouth Daily With Breakfast.     • SYNTHROID 125 MCG tablet TAKE 1 TABLET BY MOUTH ONCE DAILY ON AN EMPTY STOMACH IN THE MORNING FOR 30 DAYS  3     No current facility-administered medications for this visit.        ALLERGIES:    Allergies   Allergen Reactions   • Darvon [Propoxyphene] Hives     Associated with Darvocet which has been pulled from shelve.   • Penicillins Hives   • Percocet [Oxycodone-Acetaminophen] Hives       Objective      Vitals:    01/23/20 1438   BP: 121/65   Pulse: 67   Resp: 16   Temp: 97.3 °F (36.3 °C)   SpO2: 98%        Current Status 1/23/2020   ECOG score 0       General Appearance:Remains the same. unremarkable.Patient is awake, alert, oriented and in no acute distress. Patient is welldeveloped, wellnourished, and appears stated age.  HEENT: Normocephalic. Sclerae clear, conjunctiva pink, extraocular movements intact, pupils, round, reactive to light and  accommodation. Mouth and throat are clear with moist oral mucosa.  NECK: Supple, no jugular venous distention, thyroid not enlarged.  LYMPH: No cervical, supraclavicular, axillary, or inguinal lymphadenopathy.  CHEST: Equal bilateral expansion, AP  diameter normal, resonant percussion note  LUNGS: Good air movement, no rales, rhonchi, rubs or wheezes with auscultation  CARDIO: Regular sinus rhythm, no murmurs, gallops or rubs.  ABDOMEN: Nondistended, soft, No tenderness, no guarding, no rebound, No hepatosplenomegaly. No abdominal masses. Bowel sounds positive. No hernia  GENITALIA: Not examined.  BREASTS: Not examined.  MUSKEL: No joint swelling, decreased motion, or inflammation  EXTREMS: No edema, clubbing, cyanosis, No varicose veins.  NEURO: Grossly nonfocal, Gait is coordinated and smooth, Cognition is preserved.  SKIN: No rashes, no ecchymoses, no petechia.  PSYCH: Oriented to time, place and person. Memory is preserved. Mood and affect appear normal      RECENT LABS:  Office Visit on 01/23/2020   Component Date Value Ref Range Status   • WBC 01/23/2020 7.31  3.40 - 10.80 10*3/mm3 Final   • RBC 01/23/2020 4.08  3.77 - 5.28 10*6/mm3 Final   • Hemoglobin 01/23/2020 13.7  12.0 - 15.9 g/dL Final   • Hematocrit 01/23/2020 39.3  34.0 - 46.6 % Final   • MCV 01/23/2020 96.3  79.0 - 97.0 fL Final   • MCH 01/23/2020 33.6* 26.6 - 33.0 pg Final   • MCHC 01/23/2020 34.9  31.5 - 35.7 g/dL Final   • RDW 01/23/2020 13.0  12.3 - 15.4 % Final   • RDW-SD 01/23/2020 46.0  37.0 - 54.0 fl Final   • MPV 01/23/2020 9.7  6.0 - 12.0 fL Final   • Platelets 01/23/2020 282  140 - 450 10*3/mm3  Final   • Neutrophil % 01/23/2020 68.4  42.7 - 76.0 % Final   • Lymphocyte % 01/23/2020 20.8  19.6 - 45.3 % Final   • Monocyte % 01/23/2020 7.3  5.0 - 12.0 % Final   • Eosinophil % 01/23/2020 2.5  0.3 - 6.2 % Final   • Basophil % 01/23/2020 0.7  0.0 - 1.5 % Final   • Immature Grans % 01/23/2020 0.3  0.0 - 0.5 % Final   • Neutrophils, Absolute 01/23/2020 5.01  1.70 - 7.00 10*3/mm3 Final   • Lymphocytes, Absolute 01/23/2020 1.52  0.70 - 3.10 10*3/mm3 Final   • Monocytes, Absolute 01/23/2020 0.53  0.10 - 0.90 10*3/mm3 Final   • Eosinophils, Absolute 01/23/2020 0.18  0.00 - 0.40 10*3/mm3 Final   • Basophils, Absolute 01/23/2020 0.05  0.00 - 0.20 10*3/mm3 Final   • Immature Grans, Absolute 01/23/2020 0.02  0.00 - 0.05 10*3/mm3 Final   • nRBC 01/23/2020 0.0  0.0 - 0.2 /100 WBC Final       RADIOLOGY:  No results found.         Assessment/Plan 43 yo female with hx sig for gastric bypass with low iron due to malabsorption.    1. Anemia: iron 23, TIBC 474, %sat 5. On ferrous sulfate  -c-scoape 5/11/17 showed A 5 mm polyp was found in the transverse colon. The polyp was sessile. EGD Evidence of a Behzad-en-Y gastrojejunostomy was found with normal esophagus  -s/p injectafer  -labs reviewed with pt cr 0.69, lft nl, wbc 5.99, Hg 13.6, plt 245.     2. Hypothyroidiam: on synthroid    3. Low B12: on b12 shots monthly    4. HTN: on lisinopril  5. Gerd: on omeprazole  6. Bradycardia: echo ordered. Pt did not obtain too expensive.  7. Skin lesion: refer to derm for biopsy. Appointment not kept.        Heath Bender MD    1/23/2020    2:47 PM

## 2020-01-24 LAB
FOLATE SERPL-MCNC: 11.1 NG/ML (ref 4.78–24.2)
VIT B12 BLD-MCNC: 220 PG/ML (ref 211–946)

## 2020-01-28 ENCOUNTER — TRANSCRIBE ORDERS (OUTPATIENT)
Dept: ADMINISTRATIVE | Facility: HOSPITAL | Age: 45
End: 2020-01-28

## 2020-01-28 DIAGNOSIS — E03.9 HYPOTHYROIDISM, UNSPECIFIED TYPE: Primary | ICD-10-CM

## 2020-02-15 ENCOUNTER — APPOINTMENT (OUTPATIENT)
Dept: CT IMAGING | Facility: HOSPITAL | Age: 45
End: 2020-02-15

## 2020-02-15 ENCOUNTER — HOSPITAL ENCOUNTER (EMERGENCY)
Facility: HOSPITAL | Age: 45
Discharge: HOME | End: 2020-02-15
Admitting: EMERGENCY MEDICINE

## 2020-02-15 VITALS
SYSTOLIC BLOOD PRESSURE: 138 MMHG | OXYGEN SATURATION: 100 % | WEIGHT: 268 LBS | HEIGHT: 68 IN | BODY MASS INDEX: 40.62 KG/M2 | DIASTOLIC BLOOD PRESSURE: 90 MMHG | TEMPERATURE: 97.7 F | HEART RATE: 80 BPM | RESPIRATION RATE: 16 BRPM

## 2020-02-15 DIAGNOSIS — R10.9 ABDOMINAL PAIN, UNSPECIFIED ABDOMINAL LOCATION: Primary | ICD-10-CM

## 2020-02-15 LAB
ALBUMIN SERPL-MCNC: 3.8 G/DL (ref 3.5–5.2)
ALBUMIN/GLOB SERPL: 1.3 G/DL
ALP SERPL-CCNC: 70 U/L (ref 39–117)
ALT SERPL W P-5'-P-CCNC: 17 U/L (ref 1–33)
ANION GAP SERPL CALCULATED.3IONS-SCNC: 11 MMOL/L (ref 5–15)
AST SERPL-CCNC: 17 U/L (ref 1–32)
BASOPHILS # BLD AUTO: 0.03 10*3/MM3 (ref 0–0.2)
BASOPHILS NFR BLD AUTO: 0.4 % (ref 0–1.5)
BILIRUB SERPL-MCNC: 0.3 MG/DL (ref 0.2–1.2)
BILIRUB UR QL STRIP: NEGATIVE
BUN BLD-MCNC: 8 MG/DL (ref 6–20)
BUN/CREAT SERPL: 10.1 (ref 7–25)
CALCIUM SPEC-SCNC: 8.8 MG/DL (ref 8.6–10.5)
CHLORIDE SERPL-SCNC: 106 MMOL/L (ref 98–107)
CLARITY UR: CLEAR
CO2 SERPL-SCNC: 23 MMOL/L (ref 22–29)
COLOR UR: YELLOW
CREAT BLD-MCNC: 0.79 MG/DL (ref 0.57–1)
CRP SERPL-MCNC: 8.8 MG/DL (ref 0–0.5)
DEPRECATED RDW RBC AUTO: 43.2 FL (ref 37–54)
EOSINOPHIL # BLD AUTO: 0.18 10*3/MM3 (ref 0–0.4)
EOSINOPHIL NFR BLD AUTO: 2.6 % (ref 0.3–6.2)
ERYTHROCYTE [DISTWIDTH] IN BLOOD BY AUTOMATED COUNT: 12.5 % (ref 12.3–15.4)
GFR SERPL CREATININE-BSD FRML MDRD: 79 ML/MIN/1.73
GLOBULIN UR ELPH-MCNC: 2.9 GM/DL
GLUCOSE BLD-MCNC: 90 MG/DL (ref 65–99)
GLUCOSE UR STRIP-MCNC: NEGATIVE MG/DL
HCT VFR BLD AUTO: 37.3 % (ref 34–46.6)
HGB BLD-MCNC: 13.2 G/DL (ref 12–15.9)
HGB UR QL STRIP.AUTO: NEGATIVE
HOLD SPECIMEN: NORMAL
HOLD SPECIMEN: NORMAL
IMM GRANULOCYTES # BLD AUTO: 0.03 10*3/MM3 (ref 0–0.05)
IMM GRANULOCYTES NFR BLD AUTO: 0.4 % (ref 0–0.5)
KETONES UR QL STRIP: NEGATIVE
LEUKOCYTE ESTERASE UR QL STRIP.AUTO: NEGATIVE
LIPASE SERPL-CCNC: 31 U/L (ref 13–60)
LYMPHOCYTES # BLD AUTO: 1.6 10*3/MM3 (ref 0.7–3.1)
LYMPHOCYTES NFR BLD AUTO: 23.4 % (ref 19.6–45.3)
MCH RBC QN AUTO: 33.7 PG (ref 26.6–33)
MCHC RBC AUTO-ENTMCNC: 35.4 G/DL (ref 31.5–35.7)
MCV RBC AUTO: 95.2 FL (ref 79–97)
MONOCYTES # BLD AUTO: 0.43 10*3/MM3 (ref 0.1–0.9)
MONOCYTES NFR BLD AUTO: 6.3 % (ref 5–12)
NEUTROPHILS # BLD AUTO: 4.56 10*3/MM3 (ref 1.7–7)
NEUTROPHILS NFR BLD AUTO: 66.9 % (ref 42.7–76)
NITRITE UR QL STRIP: NEGATIVE
NRBC BLD AUTO-RTO: 0 /100 WBC (ref 0–0.2)
PH UR STRIP.AUTO: 5.5 [PH] (ref 5–8)
PLATELET # BLD AUTO: 236 10*3/MM3 (ref 140–450)
PMV BLD AUTO: 10 FL (ref 6–12)
POTASSIUM BLD-SCNC: 3.8 MMOL/L (ref 3.5–5.2)
PROT SERPL-MCNC: 6.7 G/DL (ref 6–8.5)
PROT UR QL STRIP: NEGATIVE
RBC # BLD AUTO: 3.92 10*6/MM3 (ref 3.77–5.28)
SODIUM BLD-SCNC: 140 MMOL/L (ref 136–145)
SP GR UR STRIP: <=1.005 (ref 1–1.03)
UROBILINOGEN UR QL STRIP: NORMAL
WBC NRBC COR # BLD: 6.83 10*3/MM3 (ref 3.4–10.8)
WHOLE BLOOD HOLD SPECIMEN: NORMAL
WHOLE BLOOD HOLD SPECIMEN: NORMAL

## 2020-02-15 PROCEDURE — 81003 URINALYSIS AUTO W/O SCOPE: CPT | Performed by: EMERGENCY MEDICINE

## 2020-02-15 PROCEDURE — 85025 COMPLETE CBC W/AUTO DIFF WBC: CPT | Performed by: EMERGENCY MEDICINE

## 2020-02-15 PROCEDURE — 83690 ASSAY OF LIPASE: CPT | Performed by: EMERGENCY MEDICINE

## 2020-02-15 PROCEDURE — 86140 C-REACTIVE PROTEIN: CPT | Performed by: EMERGENCY MEDICINE

## 2020-02-15 PROCEDURE — 74177 CT ABD & PELVIS W/CONTRAST: CPT

## 2020-02-15 PROCEDURE — 96374 THER/PROPH/DIAG INJ IV PUSH: CPT

## 2020-02-15 PROCEDURE — 36415 COLL VENOUS BLD VENIPUNCTURE: CPT

## 2020-02-15 PROCEDURE — 99283 EMERGENCY DEPT VISIT LOW MDM: CPT

## 2020-02-15 PROCEDURE — 25010000002 IOPAMIDOL 61 % SOLUTION: Performed by: EMERGENCY MEDICINE

## 2020-02-15 PROCEDURE — 25010000002 ONDANSETRON PER 1 MG: Performed by: EMERGENCY MEDICINE

## 2020-02-15 PROCEDURE — 80053 COMPREHEN METABOLIC PANEL: CPT | Performed by: EMERGENCY MEDICINE

## 2020-02-15 RX ORDER — ONDANSETRON 2 MG/ML
4 INJECTION INTRAMUSCULAR; INTRAVENOUS ONCE
Status: COMPLETED | OUTPATIENT
Start: 2020-02-15 | End: 2020-02-15

## 2020-02-15 RX ADMIN — ONDANSETRON HYDROCHLORIDE 4 MG: 2 SOLUTION INTRAMUSCULAR; INTRAVENOUS at 17:36

## 2020-02-15 RX ADMIN — SODIUM CHLORIDE 1000 ML: 9 INJECTION, SOLUTION INTRAVENOUS at 17:08

## 2020-02-15 RX ADMIN — IOPAMIDOL 100 ML: 612 INJECTION, SOLUTION INTRAVENOUS at 17:15

## 2020-02-16 NOTE — ED PROVIDER NOTES
Subjective   History of Present Illness  44-year-old female presents after having right lower quadrant and umbilical abdominal pain last night.  She reports her pain is actually improved quite a bit she is not requiring pain medication.  She had nausea yesterday, no vomiting, fever, or diarrhea.  She has been tolerating PO.    Review of Systems   All other systems reviewed and are negative.      Past Medical History:   Diagnosis Date   • Anemia    • Hypertension    • Hypothyroidism    • Ovarian cancer (CMS/HCC)        • Shingles        Allergies   Allergen Reactions   • Darvon [Propoxyphene] Hives     Associated with Darvocet which has been pulled from shelve.   • Penicillins Hives   • Percocet [Oxycodone-Acetaminophen] Hives       Past Surgical History:   Procedure Laterality Date   •  SECTION     • CHOLECYSTECTOMY     • COLONOSCOPY N/A 2017    Procedure: COLONOSCOPY WITH ANESTHESIA;  Surgeon: Suzan Muñoz MD;  Location: [facility] ENDOSCOPY;  Service:    • ENDOSCOPY N/A 2017    Procedure: ESOPHAGOGASTRODUODENOSCOPY WITH ANESTHESIA;  Surgeon: Suzan Muñoz MD;  Location: [facility] ENDOSCOPY;  Service:    • GASTRIC BYPASS     • LAPAROTOMY OOPHERECTOMY Left     for ovarian cancer   • MYRINGOTOMY W/ TUBES     • NOSE SURGERY     • TONSILLECTOMY         Family History   Problem Relation Age of Onset   • Diabetes Father    • Coronary artery disease Father    • Breast cancer Father 50        Had genic testing.    • Diabetes Mother    • Coronary artery disease Mother    • Stroke Mother    • No Known Problems Brother    • No Known Problems Sister    • No Known Problems Daughter    • No Known Problems Brother    • No Known Problems Brother    • Ovarian cancer Sister 20   • Colon cancer Neg Hx    • Colon polyps Neg Hx        Social History     Socioeconomic History   • Marital status:      Spouse name: Not on file   • Number of children: Not on file   • Years of education: Not on file   • Highest  education level: Not on file   Tobacco Use   • Smoking status: Never Smoker   • Smokeless tobacco: Never Used   Substance and Sexual Activity   • Alcohol use: Yes     Comment: Occasional   • Drug use: No   • Sexual activity: Yes     Partners: Male     Birth control/protection: None           Objective   Physical Exam   Constitutional: She is oriented to person, place, and time. She appears well-developed and well-nourished.   HENT:   Head: Normocephalic and atraumatic.   Eyes: Pupils are equal, round, and reactive to light. EOM are normal.   Cardiovascular: Normal rate and regular rhythm.   Pulmonary/Chest: Effort normal and breath sounds normal.   Abdominal: Normal appearance and normal aorta. There is tenderness in the right lower quadrant.   Neurological: She is alert and oriented to person, place, and time.   Skin: Skin is warm. Capillary refill takes less than 2 seconds.   Psychiatric: She has a normal mood and affect. Her behavior is normal.   Nursing note and vitals reviewed.      Procedures           ED Course                Labs Reviewed   C-REACTIVE PROTEIN - Abnormal; Notable for the following components:       Result Value    C-Reactive Protein 8.80 (*)     All other components within normal limits   CBC WITH AUTO DIFFERENTIAL - Abnormal; Notable for the following components:    MCH 33.7 (*)     All other components within normal limits   LIPASE - Normal   URINALYSIS W/ CULTURE IF INDICATED - Normal    Narrative:     Urine microscopic not indicated.   RAINBOW DRAW    Narrative:     The following orders were created for panel order [place] Draw.  Procedure                               Abnormality         Status                     ---------                               -----------         ------                     Light Blue Top[[id number]]                                   Final result               Green Top (Gel)[[id number]]                                  Final result               Lavender  Top[[id number]]                                     Final result               Red Top[[id number]]                                          Final result                 Please view results for these tests on the individual orders.   COMPREHENSIVE METABOLIC PANEL    Narrative:     GFR Normal >60  Chronic Kidney Disease <60  Kidney Failure <15     LIGHT BLUE TOP   GREEN TOP   LAVENDER TOP   RED TOP   CBC AND DIFFERENTIAL    Narrative:     The following orders were created for panel order CBC & Differential.  Procedure                               Abnormality         Status                     ---------                               -----------         ------                     CBC Auto Differential[[id number]]        Abnormal            Final result                 Please view results for these tests on the individual orders.     CT Abdomen Pelvis With Contrast   Final Result                                   MDM  Number of Diagnoses or Management Options  Diagnosis management comments: Negative CT, will dc in stable condition        Amount and/or Complexity of Data Reviewed  Clinical lab tests: reviewed and ordered  Tests in the radiology section of CPT®: ordered and reviewed  Tests in the medicine section of CPT®: ordered and reviewed    Risk of Complications, Morbidity, and/or Mortality  Presenting problems: moderate  Diagnostic procedures: moderate  Management options: moderate    Patient Progress  Patient progress: stable      Final diagnoses:   Abdominal pain, unspecified abdominal location            Torsten Lozoya PA-C  02/15/20 1810

## 2020-04-23 ENCOUNTER — APPOINTMENT (OUTPATIENT)
Dept: LAB | Facility: HOSPITAL | Age: 45
End: 2020-04-23

## 2020-06-15 ENCOUNTER — HOSPITAL ENCOUNTER (OUTPATIENT)
Dept: GENERAL RADIOLOGY | Facility: HOSPITAL | Age: 45
Discharge: HOME | End: 2020-06-15
Admitting: NURSE PRACTITIONER

## 2020-06-15 ENCOUNTER — TRANSCRIBE ORDERS (OUTPATIENT)
Dept: ADMINISTRATIVE | Facility: HOSPITAL | Age: 45
End: 2020-06-15

## 2020-06-15 DIAGNOSIS — M79.672 LEFT FOOT PAIN: Primary | ICD-10-CM

## 2020-06-15 PROCEDURE — 73630 X-RAY EXAM OF FOOT: CPT

## 2020-07-02 ENCOUNTER — TELEPHONE (OUTPATIENT)
Dept: ONCOLOGY | Facility: CLINIC | Age: 45
End: 2020-07-02

## 2020-07-02 NOTE — TELEPHONE ENCOUNTER
Called patient because she had been on our wait list for Dr. GODWIN. She didn't want to reschedule at the moment. 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

## 2022-07-22 ENCOUNTER — TELEPHONE (OUTPATIENT)
Dept: GASTROENTEROLOGY | Facility: CLINIC | Age: 47
End: 2022-07-22

## 2022-07-22 NOTE — TELEPHONE ENCOUNTER
I have sent 2 letters to pt re: recall colon and rec'd no response. I tried to call today to discuss with pt-was unable to reach them so I will send strike 3 letter to pt and noncompliant letter to PCP. I did advise on VM that pt may qualify for fast tracking-I explained what that is on VM and asked pt to call me back if interested.

## 2022-09-10 ENCOUNTER — TRANSCRIBE ORDERS (OUTPATIENT)
Dept: ADMINISTRATIVE | Facility: HOSPITAL | Age: 47
End: 2022-09-10

## 2022-09-10 ENCOUNTER — HOSPITAL ENCOUNTER (OUTPATIENT)
Dept: ULTRASOUND IMAGING | Facility: HOSPITAL | Age: 47
Discharge: HOME | End: 2022-09-10

## 2022-09-10 ENCOUNTER — HOSPITAL ENCOUNTER (OUTPATIENT)
Dept: GENERAL RADIOLOGY | Facility: HOSPITAL | Age: 47
Discharge: HOME | End: 2022-09-10

## 2022-09-10 DIAGNOSIS — N94.9 UTERUS PROBLEM: ICD-10-CM

## 2022-09-10 DIAGNOSIS — N94.9 UTERUS PROBLEM: Primary | ICD-10-CM

## 2022-09-10 PROCEDURE — 74018 RADEX ABDOMEN 1 VIEW: CPT

## 2022-09-10 PROCEDURE — 76856 US EXAM PELVIC COMPLETE: CPT

## 2022-09-12 ENCOUNTER — OFFICE VISIT (OUTPATIENT)
Dept: OBSTETRICS AND GYNECOLOGY | Facility: CLINIC | Age: 47
End: 2022-09-12

## 2022-09-12 VITALS
SYSTOLIC BLOOD PRESSURE: 128 MMHG | DIASTOLIC BLOOD PRESSURE: 94 MMHG | BODY MASS INDEX: 41.22 KG/M2 | WEIGHT: 272 LBS | HEIGHT: 68 IN

## 2022-09-12 DIAGNOSIS — Z78.9 NON-SMOKER: ICD-10-CM

## 2022-09-12 DIAGNOSIS — K62.5 BRBPR (BRIGHT RED BLOOD PER RECTUM): ICD-10-CM

## 2022-09-12 DIAGNOSIS — N81.6 RECTOCELE: ICD-10-CM

## 2022-09-12 DIAGNOSIS — R10.2 PELVIC PRESSURE IN FEMALE: Primary | ICD-10-CM

## 2022-09-12 DIAGNOSIS — Z12.4 ENCOUNTER FOR SCREENING FOR CERVICAL CANCER: ICD-10-CM

## 2022-09-12 PROCEDURE — G0123 SCREEN CERV/VAG THIN LAYER: HCPCS | Performed by: OBSTETRICS & GYNECOLOGY

## 2022-09-12 PROCEDURE — 99204 OFFICE O/P NEW MOD 45 MIN: CPT | Performed by: OBSTETRICS & GYNECOLOGY

## 2022-09-12 PROCEDURE — 87624 HPV HI-RISK TYP POOLED RSLT: CPT | Performed by: OBSTETRICS & GYNECOLOGY

## 2022-09-12 RX ORDER — CIPROFLOXACIN 500 MG/1
TABLET, FILM COATED ORAL
COMMUNITY
Start: 2022-09-12 | End: 2022-09-13

## 2022-09-12 RX ORDER — LEVOTHYROXINE SODIUM 137 MCG
137 TABLET ORAL DAILY
COMMUNITY
Start: 2022-09-01

## 2022-09-12 NOTE — PROGRESS NOTES
"Chief Complaint  Pelvic Pain (Pt has not been to office since 2017. She went to [facility] over the weekend and US was ordered due to \"swelling that I can feel internally\". Pt feels pressure in her bottom and it \"hurts to sit\". Last mammo 2019, new order placed. Last pap 2017 but patient would like to schedule for future date. )    Rachel Casiano presents to [facility]  Patient presents today with a new problem  She has stented to effect bowel movements for quite some time  Now, she is having classic pelvic pain and pressure associated with prolapse  She also has some urinary incontinence    She is also complaining of some bleeding from her rectum  This is been going on for just a few days      Objective   Vital Signs:  /94 (BP Location: Left arm, Patient Position: Sitting)   Ht 172.1 cm (67.75\")   Wt 123 kg (272 lb)   BMI 41.66 kg/m²   Estimated body mass index is 41.66 kg/m² as calculated from the following:    Height as of this encounter: 172.1 cm (67.75\").    Weight as of this encounter: 123 kg (272 lb).          Physical Exam  Vitals and nursing note reviewed. Exam conducted with a chaperone present.   Constitutional:       Appearance: Normal appearance.   HENT:      Head: Normocephalic and atraumatic.   Abdominal:      General: Abdomen is flat. Bowel sounds are normal.      Palpations: Abdomen is soft.   Genitourinary:     Comments: Anterior wall and apex are well supported.  Cervix is grossly normal.  Pap smear is collected.  She does have a moderate size rectocele noted with straining.  Rectal exam is unremarkable  Musculoskeletal:         General: Normal range of motion.      Cervical back: Normal range of motion and neck supple.   Skin:     General: Skin is warm and dry.   Neurological:      General: No focal deficit present.      Mental Status: She is alert and oriented to person, place, and time. Mental status is at baseline. "   Psychiatric:         Mood and Affect: Mood normal.         Behavior: Behavior normal.         Thought Content: Thought content normal.         Judgment: Judgment normal.        Result Review :                Assessment and Plan   Diagnoses and all orders for this visit:    1. Pelvic pressure in female (Primary)  -     Mammo screening digital tomosynthesis bilateral w CAD    2. Rectocele    3. BRBPR (bright red blood per rectum)  -     Cancel: Ambulatory Referral to Gastroenterology  -     Ambulatory Referral to Gastroenterology    4. Non-smoker    5. Encounter for screening for cervical cancer  -     Liquid-based Pap Smear, Screening             Follow Up   Return for urodynamics, then follow up with Dr Figueroa.  Patient was given instructions and counseling regarding her condition or for health maintenance advice. Please see specific information pulled into the AVS if appropriate.   Urodynamic testing and follow-up for possible surgical consultation with Dr. Figueroa  GI referral placed  Call for concerns or questions    Nawaf Aguilar MD

## 2022-09-13 ENCOUNTER — OFFICE VISIT (OUTPATIENT)
Dept: GASTROENTEROLOGY | Facility: CLINIC | Age: 47
End: 2022-09-13

## 2022-09-13 VITALS
HEIGHT: 68 IN | HEART RATE: 76 BPM | DIASTOLIC BLOOD PRESSURE: 82 MMHG | BODY MASS INDEX: 41.52 KG/M2 | TEMPERATURE: 97.1 F | OXYGEN SATURATION: 98 % | SYSTOLIC BLOOD PRESSURE: 122 MMHG | WEIGHT: 274 LBS

## 2022-09-13 DIAGNOSIS — K62.89 ANAL OR RECTAL PAIN: ICD-10-CM

## 2022-09-13 DIAGNOSIS — K62.5 RECTAL BLEEDING: Primary | ICD-10-CM

## 2022-09-13 PROCEDURE — 99204 OFFICE O/P NEW MOD 45 MIN: CPT | Performed by: NURSE PRACTITIONER

## 2022-09-13 RX ORDER — MULTIPLE VITAMINS W/ MINERALS TAB 9MG-400MCG
1 TAB ORAL DAILY
COMMUNITY

## 2022-09-13 RX ORDER — PARSLEY 450 MG
1 CAPSULE ORAL DAILY
COMMUNITY

## 2022-09-13 RX ORDER — PHENOL 1.4 %
600 AEROSOL, SPRAY (ML) MUCOUS MEMBRANE DAILY
COMMUNITY

## 2022-09-13 RX ORDER — MULTIVIT-MIN/IRON/FOLIC ACID/K 18-600-40
1 CAPSULE ORAL DAILY
COMMUNITY

## 2022-09-13 NOTE — H&P (VIEW-ONLY)
Primary Physician: Bert Jaimes, [facility]    Chief Complaint   Patient presents with   • Rectal Bleeding     Pt c/o passing BRBPR since Friday-states she is also passing tissue/clots as well; Pt's last colon was 2017   • Rectal Pain     Pt also c/o rectal pain since Friday-states it hurts to sit/do anything; Pt saw Dr. Aguilar yesterday and was dx'd with rectocele   • Nausea     Pt states the rectal pain is making her very nauseous        Subjective     Gaviota Casiano is a 47 y.o. female.    HPI   Rectal Bleeding/Pain-  Friday noticed bulging under skin near rectum.  She has noticed pink discharge from her rectum since Friday as well.  When she passes gas she will see tissue pink tinged from her rectum.  She describes her BM's as thinner in size than normal.  She has to manipulate to help her stools pass.  She is passing blood per rectum even without passing stools.  Seen by Dr Aguilar yesterday and on exam her rectum was unremarkable.  Diagnosed with a rectocele and going to follow up with Dr Figueroa for surgical consultation.  Complains of nausea for past few days since she has been hurting.      Past Medical History:   Diagnosis Date   • Anemia    • History of colon polyps    • History of ovarian cancer    • Hypertension    • Hypothyroidism    • Shingles        Past Surgical History:   Procedure Laterality Date   •  SECTION     • CHOLECYSTECTOMY     • COLONOSCOPY N/A 2017    One 5mm polyp in the transverse colon-path shows cauterized hyperplastic mucosa; The entire examined colon is normal-biopsied; Repeat 5 years   • ENDOSCOPY N/A 2017    Normal esophagus; Behzad-en-Y gastrojejunostomy with gastrojejunal anastomosis characterized by healthy appearing mucosa-biopsied; A single non-bleeding angiodysplastic lesion in the jejunum   • GASTRIC BYPASS     • LAPAROTOMY OOPHERECTOMY Left     For ovarian cancer   • MYRINGOTOMY W/ TUBES     • NOSE SURGERY     • TONSILLECTOMY          Current  Outpatient Medications:   •  Ascorbic Acid (Vitamin C) 500 MG capsule, Take 1 capsule by mouth Daily., Disp: , Rfl:   •  Ashwagandha 500 MG capsule, Take 1 capsule by mouth Daily., Disp: , Rfl:   •  calcium carbonate (OS-GIULIANA) 600 MG tablet, Take 600 mg by mouth Daily., Disp: , Rfl:   •  Cyanocobalamin (B-12 COMPLIANCE INJECTION IJ), Inject 1 dose as directed Every 30 (Thirty) Days., Disp: , Rfl:   •  ferrous sulfate 325 (65 FE) MG tablet, Take 650 mg by mouth Daily With Breakfast., Disp: , Rfl:   •  multivitamin with minerals (Multivitamin Adults) tablet tablet, Take 1 tablet by mouth Daily., Disp: , Rfl:   •  Synthroid 137 MCG tablet, Take 137 mcg by mouth Daily., Disp: , Rfl:   •  Zinc 50 MG capsule, Take 1 capsule by mouth Daily., Disp: , Rfl:     Current Facility-Administered Medications:   •  PEG-KCl-NaCl-NaSulf-Na Asc-C (PLENVU) 140 g solution reconstituted solution 140 g, 140 g, Oral, Daily, Kayleigh Russo, [facility]    Allergies   Allergen Reactions   • Darvon [Propoxyphene] Hives     Associated with Darvocet which has been pulled from shelve.   • Penicillins Hives   • Percocet [Oxycodone-Acetaminophen] Hives       Social History     Socioeconomic History   • Marital status:    Tobacco Use   • Smoking status: Never Smoker   • Smokeless tobacco: Never Used   Substance and Sexual Activity   • Alcohol use: Yes     Alcohol/week: 1.0 standard drink     Types: 1 Glasses of wine per week     Comment: Occasional   • Drug use: No   • Sexual activity: Yes     Partners: Male     Birth control/protection: None       Family History   Problem Relation Age of Onset   • Diabetes Mother    • Coronary artery disease Mother    • Stroke Mother    • Diabetes Father    • Coronary artery disease Father    • Breast cancer Father 50        Had genic testing.    • No Known Problems Sister    • Ovarian cancer Sister 20   • No Known Problems Brother    • No Known Problems Brother    • No Known Problems Brother    • No Known  "Problems Daughter    • Colon cancer Neg Hx    • Colon polyps Neg Hx    • Esophageal cancer Neg Hx    • Liver cancer Neg Hx    • Liver disease Neg Hx    • Stomach cancer Neg Hx    • Rectal cancer Neg Hx        Review of Systems   Respiratory: Negative for shortness of breath.    Cardiovascular: Negative for chest pain.   Gastrointestinal: Positive for anal bleeding, blood in stool and rectal pain.        RLQ abdomen   Genitourinary: Positive for frequency and pelvic pain. Negative for dysuria.   Musculoskeletal: Positive for back pain.       Objective     /82 (BP Location: Left arm, Patient Position: Sitting, Cuff Size: Adult)   Pulse 76   Temp 97.1 °F (36.2 °C) (Infrared)   Ht 172.1 cm (67.75\")   Wt 124 kg (274 lb)   LMP 09/10/2022 (Exact Date)   SpO2 98%   Breastfeeding No   BMI 41.97 kg/m²     Physical Exam    EXAMINATION: US PELVIS COMPLETE-     9/10/2022 2:46 PM CDT     HISTORY: ACUTE PELVIC PAIN; N94.9-Unspecified condition associated with  female genital organs and menstrual cycle     Grayscale, color-flow, and Doppler ultrasound evaluation of the pelvis.     Comparison is made with an abdomen/pelvis CT from 02/15/2020.     By history the right ovary has been removed.     The uterus measures 88 x 67 x 70 mm.  Heterogeneous uterus with at least 2 fibroids measuring 33 x 31 mm and  19 x 19 mm.     Left ovary = 48 x 34 x 40 mm.  Left ovarian cyst = 34 x 32 mm.     Appropriate Doppler and color flow imaging of the left ovary with no  sign of infarct.     Summary:  1. Uterine fibroids.  2. 34 mm left ovarian cyst.  3. No sign of ovarian infarct.  This report was finalized on 09/10/2022 15:15 by Dr. Gage Santos MD.      EXAMINATION: XR ABDOMEN KUB-     9/10/2022 3:06 PM CDT     HISTORY: ACUTE PELVIC PAIN; N94.9-Unspecified condition associated with  female genital organs and menstrual cycle     2 image KUB exam.     Normal bones and bowel gas pattern.     No bowel obstruction.  Left pelvic " calcifications are compatible with phleboliths.     No free air is seen.  The lung bases are not included.     Summary:  1. No acute abnormality.  This report was finalized on 09/10/2022 15:16 by Dr. Gage Santos MD.      IMPRESSION/PLAN:    Assessment & Plan      Problem List Items Addressed This Visit        Gastrointestinal Abdominal     Anal or rectal pain    Overview       Seen by Dr Aguilar yesterday (9/12/2022) and on exam her rectum was unremarkable.  Diagnosed with a rectocele and going to follow up with Dr Figueroa for surgical consultation.         Relevant Medications    PEG-KCl-NaCl-NaSulf-Na Asc-C (PLENVU) 140 g solution reconstituted solution 140 g (Start on 9/13/2022 10:42 AM)    Other Relevant Orders    Case Request (Completed)    Rectal bleeding - Primary    Relevant Medications    PEG-KCl-NaCl-NaSulf-Na Asc-C (PLENVU) 140 g solution reconstituted solution 140 g (Start on 9/13/2022 10:42 AM)    Other Relevant Orders    Case Request (Completed)          Avoid prolonged sitting.  Drink lots of water  Plenvu Prep          The risks, benefits, and alternatives of colonoscopy were reviewed with the patient today.  Risks including perforation of the colon possibly requiring surgery or colostomy.  Additional risks include risk of bleeding from biopsies or removal of colon tissue.  There is also the risk of a drug reaction or problems with anesthesia.  This will be discussed with the further by the anesthesia team on the day of the procedure.  Lastly there is a possibility of missing a colon polyp or cancer.  The benefits include the diagnosis and management of disease of the colon and rectum.  Alternatives to colonoscopy include barium enema, laboratory testing, radiographic evaluation, or no intervention.  The patient verbalizes understanding and agrees.    In accordance with requirements under the Affordable Care Act, [facility] has provided pricing for all hospital services and items on each of its  websites. However, a patient's actual cost may differ based on the services the patient receives to meet individual healthcare needs and based on the benefits provided under the patient’s insurance coverage.        Kayleigh Russo, APRN  09/13/22  10:40 CDT    Much of this encounter note is an electronic transcription/translation of spoken language to printed text. The electronic translation of spoken language may permit erroneous, or at times, nonsensical words or phrases to be inadvertently transcribed; although I have reviewed the note for such errors, some may still exist.

## 2022-09-13 NOTE — PROGRESS NOTES
Primary Physician: Bert Jaimes, [facility]    Chief Complaint   Patient presents with   • Rectal Bleeding     Pt c/o passing BRBPR since Friday-states she is also passing tissue/clots as well; Pt's last colon was 2017   • Rectal Pain     Pt also c/o rectal pain since Friday-states it hurts to sit/do anything; Pt saw Dr. Aguilar yesterday and was dx'd with rectocele   • Nausea     Pt states the rectal pain is making her very nauseous        Subjective     Gaviota Casiano is a 47 y.o. female.    HPI   Rectal Bleeding/Pain-  Friday noticed bulging under skin near rectum.  She has noticed pink discharge from her rectum since Friday as well.  When she passes gas she will see tissue pink tinged from her rectum.  She describes her BM's as thinner in size than normal.  She has to manipulate to help her stools pass.  She is passing blood per rectum even without passing stools.  Seen by Dr Aguilar yesterday and on exam her rectum was unremarkable.  Diagnosed with a rectocele and going to follow up with Dr Figueroa for surgical consultation.  Complains of nausea for past few days since she has been hurting.      Past Medical History:   Diagnosis Date   • Anemia    • History of colon polyps    • History of ovarian cancer    • Hypertension    • Hypothyroidism    • Shingles        Past Surgical History:   Procedure Laterality Date   •  SECTION     • CHOLECYSTECTOMY     • COLONOSCOPY N/A 2017    One 5mm polyp in the transverse colon-path shows cauterized hyperplastic mucosa; The entire examined colon is normal-biopsied; Repeat 5 years   • ENDOSCOPY N/A 2017    Normal esophagus; Behzad-en-Y gastrojejunostomy with gastrojejunal anastomosis characterized by healthy appearing mucosa-biopsied; A single non-bleeding angiodysplastic lesion in the jejunum   • GASTRIC BYPASS     • LAPAROTOMY OOPHERECTOMY Left     For ovarian cancer   • MYRINGOTOMY W/ TUBES     • NOSE SURGERY     • TONSILLECTOMY          Current  Outpatient Medications:   •  Ascorbic Acid (Vitamin C) 500 MG capsule, Take 1 capsule by mouth Daily., Disp: , Rfl:   •  Ashwagandha 500 MG capsule, Take 1 capsule by mouth Daily., Disp: , Rfl:   •  calcium carbonate (OS-GIULIANA) 600 MG tablet, Take 600 mg by mouth Daily., Disp: , Rfl:   •  Cyanocobalamin (B-12 COMPLIANCE INJECTION IJ), Inject 1 dose as directed Every 30 (Thirty) Days., Disp: , Rfl:   •  ferrous sulfate 325 (65 FE) MG tablet, Take 650 mg by mouth Daily With Breakfast., Disp: , Rfl:   •  multivitamin with minerals (Multivitamin Adults) tablet tablet, Take 1 tablet by mouth Daily., Disp: , Rfl:   •  Synthroid 137 MCG tablet, Take 137 mcg by mouth Daily., Disp: , Rfl:   •  Zinc 50 MG capsule, Take 1 capsule by mouth Daily., Disp: , Rfl:     Current Facility-Administered Medications:   •  PEG-KCl-NaCl-NaSulf-Na Asc-C (PLENVU) 140 g solution reconstituted solution 140 g, 140 g, Oral, Daily, Kayleigh Russo, [facility]    Allergies   Allergen Reactions   • Darvon [Propoxyphene] Hives     Associated with Darvocet which has been pulled from shelve.   • Penicillins Hives   • Percocet [Oxycodone-Acetaminophen] Hives       Social History     Socioeconomic History   • Marital status:    Tobacco Use   • Smoking status: Never Smoker   • Smokeless tobacco: Never Used   Substance and Sexual Activity   • Alcohol use: Yes     Alcohol/week: 1.0 standard drink     Types: 1 Glasses of wine per week     Comment: Occasional   • Drug use: No   • Sexual activity: Yes     Partners: Male     Birth control/protection: None       Family History   Problem Relation Age of Onset   • Diabetes Mother    • Coronary artery disease Mother    • Stroke Mother    • Diabetes Father    • Coronary artery disease Father    • Breast cancer Father 50        Had genic testing.    • No Known Problems Sister    • Ovarian cancer Sister 20   • No Known Problems Brother    • No Known Problems Brother    • No Known Problems Brother    • No Known  "Problems Daughter    • Colon cancer Neg Hx    • Colon polyps Neg Hx    • Esophageal cancer Neg Hx    • Liver cancer Neg Hx    • Liver disease Neg Hx    • Stomach cancer Neg Hx    • Rectal cancer Neg Hx        Review of Systems   Respiratory: Negative for shortness of breath.    Cardiovascular: Negative for chest pain.   Gastrointestinal: Positive for anal bleeding, blood in stool and rectal pain.        RLQ abdomen   Genitourinary: Positive for frequency and pelvic pain. Negative for dysuria.   Musculoskeletal: Positive for back pain.       Objective     /82 (BP Location: Left arm, Patient Position: Sitting, Cuff Size: Adult)   Pulse 76   Temp 97.1 °F (36.2 °C) (Infrared)   Ht 172.1 cm (67.75\")   Wt 124 kg (274 lb)   LMP 09/10/2022 (Exact Date)   SpO2 98%   Breastfeeding No   BMI 41.97 kg/m²     Physical Exam    EXAMINATION: US PELVIS COMPLETE-     9/10/2022 2:46 PM CDT     HISTORY: ACUTE PELVIC PAIN; N94.9-Unspecified condition associated with  female genital organs and menstrual cycle     Grayscale, color-flow, and Doppler ultrasound evaluation of the pelvis.     Comparison is made with an abdomen/pelvis CT from 02/15/2020.     By history the right ovary has been removed.     The uterus measures 88 x 67 x 70 mm.  Heterogeneous uterus with at least 2 fibroids measuring 33 x 31 mm and  19 x 19 mm.     Left ovary = 48 x 34 x 40 mm.  Left ovarian cyst = 34 x 32 mm.     Appropriate Doppler and color flow imaging of the left ovary with no  sign of infarct.     Summary:  1. Uterine fibroids.  2. 34 mm left ovarian cyst.  3. No sign of ovarian infarct.  This report was finalized on 09/10/2022 15:15 by Dr. Gage Santos MD.      EXAMINATION: XR ABDOMEN KUB-     9/10/2022 3:06 PM CDT     HISTORY: ACUTE PELVIC PAIN; N94.9-Unspecified condition associated with  female genital organs and menstrual cycle     2 image KUB exam.     Normal bones and bowel gas pattern.     No bowel obstruction.  Left pelvic " calcifications are compatible with phleboliths.     No free air is seen.  The lung bases are not included.     Summary:  1. No acute abnormality.  This report was finalized on 09/10/2022 15:16 by Dr. Gage Santos MD.      IMPRESSION/PLAN:    Assessment & Plan      Problem List Items Addressed This Visit        Gastrointestinal Abdominal     Anal or rectal pain    Overview       Seen by Dr Aguilar yesterday (9/12/2022) and on exam her rectum was unremarkable.  Diagnosed with a rectocele and going to follow up with Dr Figueroa for surgical consultation.         Relevant Medications    PEG-KCl-NaCl-NaSulf-Na Asc-C (PLENVU) 140 g solution reconstituted solution 140 g (Start on 9/13/2022 10:42 AM)    Other Relevant Orders    Case Request (Completed)    Rectal bleeding - Primary    Relevant Medications    PEG-KCl-NaCl-NaSulf-Na Asc-C (PLENVU) 140 g solution reconstituted solution 140 g (Start on 9/13/2022 10:42 AM)    Other Relevant Orders    Case Request (Completed)          Avoid prolonged sitting.  Drink lots of water  Plenvu Prep          The risks, benefits, and alternatives of colonoscopy were reviewed with the patient today.  Risks including perforation of the colon possibly requiring surgery or colostomy.  Additional risks include risk of bleeding from biopsies or removal of colon tissue.  There is also the risk of a drug reaction or problems with anesthesia.  This will be discussed with the further by the anesthesia team on the day of the procedure.  Lastly there is a possibility of missing a colon polyp or cancer.  The benefits include the diagnosis and management of disease of the colon and rectum.  Alternatives to colonoscopy include barium enema, laboratory testing, radiographic evaluation, or no intervention.  The patient verbalizes understanding and agrees.    In accordance with requirements under the Affordable Care Act, [facility] has provided pricing for all hospital services and items on each of its  websites. However, a patient's actual cost may differ based on the services the patient receives to meet individual healthcare needs and based on the benefits provided under the patient’s insurance coverage.        Kayleigh Russo, APRN  09/13/22  10:40 CDT    Much of this encounter note is an electronic transcription/translation of spoken language to printed text. The electronic translation of spoken language may permit erroneous, or at times, nonsensical words or phrases to be inadvertently transcribed; although I have reviewed the note for such errors, some may still exist.

## 2022-09-14 LAB
GEN CATEG CVX/VAG CYTO-IMP: NORMAL
HPV I/H RISK 4 DNA CVX QL PROBE+SIG AMP: NOT DETECTED
LAB AP CASE REPORT: NORMAL
LAB AP GYN ADDITIONAL INFORMATION: NORMAL
LAB AP GYN OTHER FINDINGS: NORMAL
Lab: NORMAL
PATH INTERP SPEC-IMP: NORMAL
STAT OF ADQ CVX/VAG CYTO-IMP: NORMAL

## 2022-09-21 ENCOUNTER — TELEPHONE (OUTPATIENT)
Dept: GASTROENTEROLOGY | Facility: CLINIC | Age: 47
End: 2022-09-21

## 2022-09-21 NOTE — TELEPHONE ENCOUNTER
Pt called and said her pharmacy hadn't received her Plenvu prescription. Left vm on prescriber line at [facility] for Plenvu #1 kit as directed with no refills. Pt will call back with any questions.

## 2022-09-22 ENCOUNTER — OUTSIDE FACILITY SERVICE (OUTPATIENT)
Dept: OBSTETRICS AND GYNECOLOGY | Facility: CLINIC | Age: 47
End: 2022-09-22

## 2022-09-22 ENCOUNTER — TELEPHONE (OUTPATIENT)
Dept: GASTROENTEROLOGY | Facility: CLINIC | Age: 47
End: 2022-09-22

## 2022-09-22 PROCEDURE — 51784 ANAL/URINARY MUSCLE STUDY: CPT | Performed by: OBSTETRICS & GYNECOLOGY

## 2022-09-22 PROCEDURE — 51797 INTRAABDOMINAL PRESSURE TEST: CPT | Performed by: OBSTETRICS & GYNECOLOGY

## 2022-09-22 PROCEDURE — 51741 ELECTRO-UROFLOWMETRY FIRST: CPT | Performed by: OBSTETRICS & GYNECOLOGY

## 2022-09-22 PROCEDURE — 51729 CYSTOMETROGRAM W/VP&UP: CPT | Performed by: OBSTETRICS & GYNECOLOGY

## 2022-09-22 NOTE — TELEPHONE ENCOUNTER
"Pt called and said that she saw on her e-check in from [facility] for her colonoscopy on 9/26 that she owed over $1000 before we could scope her when she comes in. I explained to her that she did not owe anything up front and that is a \"worst case scenario\" price the hospital is now required to give. She told me she called her insurance and they said they would pay 100% for it since it was just a screening colonoscopy. I read to her from her chart that she was seen in the office for rectal bleeding and rectal pain, as well as being referred from Dr. Aguilar for BRBPR, which she tells me he was referring her because she was due for her 5 yr colon recall. She is concerned she is going to owe a lot of money. I again told her she is not required to pay anything before she is scoped and that payments can be arranged if she does have an out of pocket cost. She asked if she could come back in and be seen for a colon screening so that it could be changed and I told her we would not be able to do that because she is having problems. She still seemed concerned and I told her I would message my  to see if there was anything else we could do. Pt sonia.  "

## 2022-09-23 ENCOUNTER — HOSPITAL ENCOUNTER (OUTPATIENT)
Dept: MAMMOGRAPHY | Facility: HOSPITAL | Age: 47
Discharge: HOME | End: 2022-09-23
Admitting: OBSTETRICS & GYNECOLOGY

## 2022-09-23 PROCEDURE — 77063 BREAST TOMOSYNTHESIS BI: CPT

## 2022-09-23 PROCEDURE — 77067 SCR MAMMO BI INCL CAD: CPT

## 2022-09-26 ENCOUNTER — ANESTHESIA EVENT (OUTPATIENT)
Dept: GASTROENTEROLOGY | Facility: HOSPITAL | Age: 47
End: 2022-09-26

## 2022-09-26 ENCOUNTER — HOSPITAL ENCOUNTER (OUTPATIENT)
Facility: HOSPITAL | Age: 47
Setting detail: HOSPITAL OUTPATIENT SURGERY
Discharge: HOME | End: 2022-09-26
Attending: INTERNAL MEDICINE | Admitting: INTERNAL MEDICINE

## 2022-09-26 ENCOUNTER — ANESTHESIA (OUTPATIENT)
Dept: GASTROENTEROLOGY | Facility: HOSPITAL | Age: 47
End: 2022-09-26

## 2022-09-26 VITALS
RESPIRATION RATE: 18 BRPM | TEMPERATURE: 97.3 F | OXYGEN SATURATION: 98 % | HEART RATE: 63 BPM | BODY MASS INDEX: 40.77 KG/M2 | DIASTOLIC BLOOD PRESSURE: 88 MMHG | HEIGHT: 68 IN | WEIGHT: 269 LBS | SYSTOLIC BLOOD PRESSURE: 116 MMHG

## 2022-09-26 DIAGNOSIS — K62.5 RECTAL BLEEDING: ICD-10-CM

## 2022-09-26 DIAGNOSIS — K62.89 ANAL OR RECTAL PAIN: ICD-10-CM

## 2022-09-26 LAB — B-HCG UR QL: NEGATIVE

## 2022-09-26 PROCEDURE — 25010000002 PROPOFOL 10 MG/ML EMULSION: Performed by: NURSE ANESTHETIST, CERTIFIED REGISTERED

## 2022-09-26 PROCEDURE — 81025 URINE PREGNANCY TEST: CPT | Performed by: NURSE ANESTHETIST, CERTIFIED REGISTERED

## 2022-09-26 PROCEDURE — 45380 COLONOSCOPY AND BIOPSY: CPT | Performed by: INTERNAL MEDICINE

## 2022-09-26 PROCEDURE — 88305 TISSUE EXAM BY PATHOLOGIST: CPT | Performed by: INTERNAL MEDICINE

## 2022-09-26 RX ORDER — PROPOFOL 10 MG/ML
VIAL (ML) INTRAVENOUS AS NEEDED
Status: DISCONTINUED | OUTPATIENT
Start: 2022-09-26 | End: 2022-09-26 | Stop reason: SURG

## 2022-09-26 RX ORDER — LIDOCAINE HYDROCHLORIDE 20 MG/ML
INJECTION, SOLUTION EPIDURAL; INFILTRATION; INTRACAUDAL; PERINEURAL AS NEEDED
Status: DISCONTINUED | OUTPATIENT
Start: 2022-09-26 | End: 2022-09-26 | Stop reason: SURG

## 2022-09-26 RX ORDER — SODIUM CHLORIDE 9 MG/ML
500 INJECTION, SOLUTION INTRAVENOUS CONTINUOUS PRN
Status: DISCONTINUED | OUTPATIENT
Start: 2022-09-26 | End: 2022-09-26 | Stop reason: HOSPADM

## 2022-09-26 RX ORDER — LIDOCAINE HYDROCHLORIDE 10 MG/ML
0.5 INJECTION, SOLUTION EPIDURAL; INFILTRATION; INTRACAUDAL; PERINEURAL ONCE AS NEEDED
Status: DISCONTINUED | OUTPATIENT
Start: 2022-09-26 | End: 2022-09-26 | Stop reason: HOSPADM

## 2022-09-26 RX ORDER — SODIUM CHLORIDE 0.9 % (FLUSH) 0.9 %
10 SYRINGE (ML) INJECTION AS NEEDED
Status: DISCONTINUED | OUTPATIENT
Start: 2022-09-26 | End: 2022-09-26 | Stop reason: HOSPADM

## 2022-09-26 RX ADMIN — PROPOFOL 550 MG: 10 INJECTION, EMULSION INTRAVENOUS at 08:27

## 2022-09-26 RX ADMIN — SODIUM CHLORIDE: 0.9 INJECTION, SOLUTION INTRAVENOUS at 08:22

## 2022-09-26 RX ADMIN — LIDOCAINE HYDROCHLORIDE 50 MG: 20 INJECTION, SOLUTION EPIDURAL; INFILTRATION; INTRACAUDAL; PERINEURAL at 08:27

## 2022-09-26 NOTE — ANESTHESIA PREPROCEDURE EVALUATION
Anesthesia Evaluation     Patient summary reviewed   no history of anesthetic complications:  NPO Solid Status: > 8 hours  NPO Liquid Status: > 8 hours           Airway   Mallampati: I  TM distance: >3 FB  Neck ROM: full  No difficulty expected  Dental      Pulmonary - negative pulmonary ROS and normal exam   Cardiovascular - normal exam  Exercise tolerance: excellent (>7 METS)    (-) hypertension      Neuro/Psych- negative ROS  GI/Hepatic/Renal/Endo    (+) obesity, morbid obesity, GI bleeding resolved, thyroid problem hypothyroidism    Musculoskeletal (-) negative ROS    Abdominal  - normal exam   Substance History   (+) alcohol use,      OB/GYN          Other      history of cancer remission                    Anesthesia Plan    ASA 2     MAC     intravenous induction     Anesthetic plan, risks, benefits, and alternatives have been provided, discussed and informed consent has been obtained with: patient.        CODE STATUS:

## 2022-09-27 LAB
CYTO UR: NORMAL
LAB AP CASE REPORT: NORMAL
Lab: NORMAL
PATH REPORT.FINAL DX SPEC: NORMAL
PATH REPORT.GROSS SPEC: NORMAL

## 2022-10-17 ENCOUNTER — OFFICE VISIT (OUTPATIENT)
Dept: OBSTETRICS AND GYNECOLOGY | Facility: CLINIC | Age: 47
End: 2022-10-17

## 2022-10-17 VITALS
WEIGHT: 275 LBS | HEIGHT: 68 IN | SYSTOLIC BLOOD PRESSURE: 116 MMHG | BODY MASS INDEX: 41.68 KG/M2 | DIASTOLIC BLOOD PRESSURE: 88 MMHG

## 2022-10-17 DIAGNOSIS — N81.6 RECTOCELE: Primary | ICD-10-CM

## 2022-10-17 DIAGNOSIS — N83.202 LEFT OVARIAN CYST: ICD-10-CM

## 2022-10-17 PROCEDURE — 99214 OFFICE O/P EST MOD 30 MIN: CPT | Performed by: OBSTETRICS & GYNECOLOGY

## 2022-10-17 RX ORDER — SODIUM CHLORIDE 0.9 % (FLUSH) 0.9 %
1-10 SYRINGE (ML) INJECTION AS NEEDED
Status: CANCELLED | OUTPATIENT
Start: 2022-10-17

## 2022-10-17 RX ORDER — SODIUM CHLORIDE 9 MG/ML
100 INJECTION, SOLUTION INTRAVENOUS CONTINUOUS
Status: CANCELLED | OUTPATIENT
Start: 2022-10-17

## 2022-10-17 RX ORDER — SODIUM CHLORIDE 0.9 % (FLUSH) 0.9 %
10 SYRINGE (ML) INJECTION EVERY 12 HOURS SCHEDULED
Status: CANCELLED | OUTPATIENT
Start: 2022-10-17

## 2022-10-17 NOTE — PROGRESS NOTES
"Gaviota Casiano is a 47 y.o. female here today to discuss treatment of pelvic prolapse.  She has a sensation of pelvic pressure and vaginal obstruction.  She reports having to splint to have a bowel movement.  She is also aware of vaginal bulging with intercourse or insertion of the tampon.  She is para 1 having had a  after pushing for 3 hours with an 11 pound baby.  She reports that the head delivered but had to be replaced as the shoulders would not deliver.  She had colonoscopy on  which revealed a nodule in the distal rectum which was biopsied.  I have reviewed the pathology report showing an adenomatous polyp.    Visit Vitals  /88 (BP Location: Left arm, Patient Position: Sitting)   Ht 172.7 cm (68\")   Wt 125 kg (275 lb)   LMP 10/10/2022 (Exact Date)   BMI 41.81 kg/m²     Pleasant female no acute distress  Mood and affect normal  Breathing unlabored  Pelvic exam was performed  Grade 2-3 rectocele, no cystocele or uterine prolapse    I reviewed the report from urodynamic testing performed on  showing normal results.    Pap smear performed on  was normal and HPV negative    I reviewed a pelvic ultrasound report dated September 10 performed for pelvic pain.  I have also visualized the images to form own interpretation.  There is a 3.4 cm simple left ovarian cyst.    Assessment: Symptomatic rectocele, left ovarian cyst, adenomatous polyp of rectum    She would like to have surgical repair of her rectocele, and I have asked her to get clearance from her gastroenterologist.  We discussed nonsurgical treatment options including expectant management, pelvic floor exercises, and pessaries.  We discussed the importance of taking a stool softener and avoiding constipation to avoid recurrence.  We discussed a 10 to 15% chance of recurrence after surgical repair.  She will return to the office in a few weeks for a preop visit and repeat pelvic ultrasound to follow-up the " cyst.  She is scheduled for surgery on November 30.  Preoperative orders including CBC and BMP have been placed.  Call with questions or concerns.

## 2022-11-07 ENCOUNTER — TELEPHONE (OUTPATIENT)
Dept: GASTROENTEROLOGY | Facility: CLINIC | Age: 47
End: 2022-11-07

## 2022-11-07 NOTE — TELEPHONE ENCOUNTER
Pt returned my call and I spoke to her re: results-she VU and can come in this Wed. Due to her schedule-she needs a 1:45pm spot-I will have Christel add her in and Dr. Muñoz ok'd that time. Pt advised to call me back with any further questions/problems.

## 2022-11-07 NOTE — TELEPHONE ENCOUNTER
Please let her know that the nodule in her rectum that I biopsied only shows that it is a colon polyp.  There is no evidence of any cancer or malignancy.  This was not removed because of its unusual appearance at the time of colonoscopy.  I would be happy to see her in the office this Wednesday to regroup with her regarding the plan to remove this.  I know that she has surgery coming up from a gynecology point of view.    Suzan Muñoz MD

## 2022-11-09 ENCOUNTER — OFFICE VISIT (OUTPATIENT)
Dept: GASTROENTEROLOGY | Facility: CLINIC | Age: 47
End: 2022-11-09

## 2022-11-09 VITALS
SYSTOLIC BLOOD PRESSURE: 128 MMHG | HEIGHT: 68 IN | WEIGHT: 274 LBS | OXYGEN SATURATION: 98 % | TEMPERATURE: 96.9 F | HEART RATE: 73 BPM | BODY MASS INDEX: 41.52 KG/M2 | DIASTOLIC BLOOD PRESSURE: 80 MMHG

## 2022-11-09 DIAGNOSIS — Z86.010 HISTORY OF ADENOMATOUS POLYP OF COLON: Primary | ICD-10-CM

## 2022-11-09 PROBLEM — Z98.84 HISTORY OF ROUX-EN-Y GASTRIC BYPASS: Status: ACTIVE | Noted: 2022-11-09

## 2022-11-09 PROBLEM — K55.20 ANGIODYSPLASIA: Status: ACTIVE | Noted: 2022-11-09

## 2022-11-09 PROBLEM — Z86.0101 HISTORY OF ADENOMATOUS POLYP OF COLON: Status: ACTIVE | Noted: 2022-09-13

## 2022-11-09 PROCEDURE — S0285 CNSLT BEFORE SCREEN COLONOSC: HCPCS | Performed by: INTERNAL MEDICINE

## 2022-11-09 NOTE — PROGRESS NOTES
Primary Physician: Bert Jaimes APRN    Chief Complaint   Patient presents with   • Follow-up     Pt presents today for procedure follow up-had colon 2022 and is here to discuss path results       Subjective     Gaviota Casiano is a 47 y.o. female.    HPI   Colon polyp  Colonoscopy was done 2022 to assess rectal bleeding.  One 12 mm nodule was found in the distal rectum. This was palpated on MILA and seen endoscopically. Biopsies showed adenomatous polyp with ulceration.  The polyp was unusual in appearance therefore it was not removed at the time of that evaluation; I was expecting to get a report of carcinoid.         Past Medical History:   Diagnosis Date   • Anemia    • History of colon polyps    • History of ovarian cancer    • Hypertension    • Hypothyroidism    • Shingles        Past Surgical History:   Procedure Laterality Date   •  SECTION     • CHOLECYSTECTOMY     • COLONOSCOPY N/A 2017    One 5mm polyp in the transverse colon-path shows cauterized hyperplastic mucosa; The entire examined colon is normal-biopsied; Repeat 5 years   • COLONOSCOPY N/A 2022    Nodule in the distal rectum-biopsied; Non-bleeding internal hemorrhoids; Repeat 5 years   • ENDOSCOPY N/A 2017    Normal esophagus; Behzad-en-Y gastrojejunostomy with gastrojejunal anastomosis characterized by healthy appearing mucosa-biopsied; A single non-bleeding angiodysplastic lesion in the jejunum   • GASTRIC BYPASS     • LAPAROTOMY OOPHERECTOMY Left     For ovarian cancer   • MYRINGOTOMY W/ TUBES     • NOSE SURGERY     • TONSILLECTOMY          Current Outpatient Medications:   •  Ascorbic Acid (Vitamin C) 500 MG capsule, Take 1 capsule by mouth Daily., Disp: , Rfl:   •  Ashwagandha 500 MG capsule, Take 1 capsule by mouth Daily., Disp: , Rfl:   •  calcium carbonate (OS-GIULIANA) 600 MG tablet, Take 600 mg by mouth Daily., Disp: , Rfl:   •  Cyanocobalamin (B-12 COMPLIANCE INJECTION IJ), Inject 1 dose as  "directed Every 30 (Thirty) Days., Disp: , Rfl:   •  ferrous sulfate 325 (65 FE) MG tablet, Take 650 mg by mouth Daily With Breakfast., Disp: , Rfl:   •  multivitamin with minerals tablet tablet, Take 1 tablet by mouth Daily., Disp: , Rfl:   •  Synthroid 137 MCG tablet, Take 137 mcg by mouth Daily., Disp: , Rfl:   •  Zinc 50 MG capsule, Take 1 capsule by mouth Daily., Disp: , Rfl:     Allergies   Allergen Reactions   • Darvon [Propoxyphene] Hives     Associated with Darvocet which has been pulled from shelve.   • Penicillins Hives   • Percocet [Oxycodone-Acetaminophen] Hives       Social History     Socioeconomic History   • Marital status:    Tobacco Use   • Smoking status: Never   • Smokeless tobacco: Never   Vaping Use   • Vaping Use: Never used   Substance and Sexual Activity   • Alcohol use: Yes     Alcohol/week: 1.0 standard drink     Types: 1 Glasses of wine per week     Comment: Occasional   • Drug use: No   • Sexual activity: Yes     Partners: Male     Birth control/protection: None       Family History   Problem Relation Age of Onset   • Diabetes Mother    • Coronary artery disease Mother    • Stroke Mother    • Diabetes Father    • Coronary artery disease Father    • Breast cancer Father 50        Had genic testing.    • No Known Problems Sister    • Ovarian cancer Sister 20   • No Known Problems Brother    • No Known Problems Brother    • No Known Problems Brother    • No Known Problems Daughter    • Colon cancer Neg Hx    • Colon polyps Neg Hx    • Esophageal cancer Neg Hx    • Liver cancer Neg Hx    • Liver disease Neg Hx    • Stomach cancer Neg Hx    • Rectal cancer Neg Hx        Review of Systems   Respiratory: Negative for shortness of breath.    Cardiovascular: Negative for chest pain.       Objective     /80 (BP Location: Left arm, Patient Position: Sitting, Cuff Size: Adult)   Pulse 73   Temp 96.9 °F (36.1 °C) (Infrared)   Ht 172.7 cm (68\")   Wt 124 kg (274 lb)   LMP 10/10/2022 " (Exact Date)   SpO2 98%   Breastfeeding No   BMI 41.66 kg/m²     Physical Exam  Constitutional:       Appearance: She is well-developed.   Pulmonary:      Effort: Pulmonary effort is normal.   Musculoskeletal:         General: Normal range of motion.   Skin:     General: Skin is warm.   Neurological:      Mental Status: She is alert and oriented to person, place, and time.   Psychiatric:         Behavior: Behavior normal.           Colonoscopy pathology report 9/2022:  Final Diagnosis   Nodule at rectum, biopsies:  Adenomatous polyp with ulceration.  No evidence of high-grade dysplasia or malignancy.   Electronically signed by Denilson Brock MD on 9/27/2022 at 1042       IMPRESSION/PLAN:    Assessment & Plan      Problem List Items Addressed This Visit        Gastrointestinal Abdominal     History of adenomatous polyp of colon - Primary    Overview     Diagnosed with a rectocele 9/2022 by Dr. Aguilar and going to follow up with Dr Figueroa for surgical consultation.    Rectal nodule on right wall ~1.2cm--bxd showing adenomatous polyp.  It was not removed initially.    I reviewed pathology with her.  I showed her pictures and showed that it was slightly atypical in appearance.  We will plan on flexible sigmoidoscopy with endoscopic removal.  We will marked with ink.  We will plan on doing this next week so that all will be completed prior to her gynecological surgery.                              Suzan Muñoz MD  11/09/22  13:54 CST    Much of this encounter note is an electronic transcription/translation of spoken language to printed text. The electronic translation of spoken language may permit erroneous, or at times, nonsensical words or phrases to be inadvertently transcribed; although I have reviewed the note for such errors, some may still exist.

## 2022-11-17 ENCOUNTER — OFFICE VISIT (OUTPATIENT)
Dept: OBSTETRICS AND GYNECOLOGY | Facility: CLINIC | Age: 47
End: 2022-11-17

## 2022-11-17 ENCOUNTER — PRE-ADMISSION TESTING (OUTPATIENT)
Dept: PREADMISSION TESTING | Facility: HOSPITAL | Age: 47
End: 2022-11-17

## 2022-11-17 VITALS
BODY MASS INDEX: 41.77 KG/M2 | WEIGHT: 275.57 LBS | HEART RATE: 61 BPM | OXYGEN SATURATION: 99 % | HEIGHT: 68 IN | SYSTOLIC BLOOD PRESSURE: 165 MMHG | DIASTOLIC BLOOD PRESSURE: 105 MMHG | RESPIRATION RATE: 16 BRPM

## 2022-11-17 VITALS
WEIGHT: 274 LBS | BODY MASS INDEX: 41.52 KG/M2 | SYSTOLIC BLOOD PRESSURE: 126 MMHG | DIASTOLIC BLOOD PRESSURE: 86 MMHG | HEIGHT: 68 IN

## 2022-11-17 DIAGNOSIS — N81.6 RECTOCELE: Primary | ICD-10-CM

## 2022-11-17 PROCEDURE — 99213 OFFICE O/P EST LOW 20 MIN: CPT | Performed by: OBSTETRICS & GYNECOLOGY

## 2022-11-17 PROCEDURE — 80048 BASIC METABOLIC PNL TOTAL CA: CPT | Performed by: OBSTETRICS & GYNECOLOGY

## 2022-11-17 PROCEDURE — 85027 COMPLETE CBC AUTOMATED: CPT | Performed by: OBSTETRICS & GYNECOLOGY

## 2022-11-17 RX ORDER — SACCHAROMYCES BOULARDII 250 MG
250 CAPSULE ORAL 2 TIMES DAILY
COMMUNITY

## 2022-11-17 NOTE — PROGRESS NOTES
"Gaviota Casiano is a 47 y.o. female here today for follow-up of a left ovarian cyst and to discuss treatment of a symptomatic rectocele.  She had an ultrasound in September that showed a 3 cm left ovarian cyst.  She denies abdominal pain today.  She desires repair of her rectocele which we evaluated at her visit last month.  She has a repeat colonoscopy scheduled in the next few days, but reports that she has been cleared by GI to proceed with her prolapse repair.    Visit Vitals  /86 (BP Location: Left arm, Patient Position: Sitting)   Ht 172.7 cm (68\")   Wt 124 kg (274 lb)   LMP 11/14/2022 (Approximate)   BMI 41.66 kg/m²     Pleasant female no acute distress  Mood and affect normal  Breathing unlabored  Based on prior exam, there is a grade 2-3 rectocele but no other significant prolapse    A pelvic ultrasound was ordered and performed in the office today.  The uterus and ovaries appear normal.  The previously visualized cyst has resolved.    Assessment: Left ovarian cyst, resolved.  Symptomatic rectocele    We again discussed a planned procedure of posterior vaginal repair.  We discussed surgical risks of bleeding, infection, and damage to surrounding tissues.  We discussed recurrence rates.  She is scheduled for surgery on November 30.  All her questions have been answered, call with questions or concerns meantime.      "

## 2022-11-17 NOTE — DISCHARGE INSTRUCTIONS
Before you come to the hospital        Arrival time: AS DIRECTED BY OFFICE              ALL OTHER HOME MEDICATION CHECK WITH YOUR PHYSICIAN (especially if you are taking diabetes medicines or blood thinners)              Eating and drinking restrictions prior to scheduled arrival time    2 Hours before arrival time STOP   Drinking Clear liquids (water, apple juice-no pulp)     6 Hours before arrival time STOP   Milk or drinks that contain milk, full liquids    6 Hours before arrival time STOP   Light meals or foods, such as toast or cereal    8 Hours before arrival time STOP   Heavy foods, such as meat, fried foods, or fatty foods    (It is extremely important that you follow these guidelines to prevent delay or cancelation of your procedure)     Clear Liquids  Water and flavored water                                                                      Clear Fruit juices, such as cranberry juice and apple juice.  Black coffee (NO cream of any kind, including powdered).  Plain tea  Clear bouillon or broth.  Flavored gelatin.  Soda.  Gatorade or Powerade.  Full liquid examples  Juices that have pulp.  Frozen ice pops that contain fruit pieces.  Coffee with creamer  Milk.  Yogurt.                MANAGING PAIN AFTER SURGERY    We know you are probably wondering what your pain will be like after surgery.  Following surgery it is unrealistic to expect you will not have pain.   Pain is how our bodies let us know that something is wrong or cautions us to be careful.  That said, our goal is to make your pain tolerable.    Methods we may use to treat your pain include (oral or IV medications, PCAs, epidurals, nerve blocks, etc.)   While some procedures require IV pain medications for a short time after surgery, transitioning to pain medications by mouth allows for better management of pain.   Your nurse will encourage you to take oral pain medications whenever possible.  IV medications work almost immediately, but only last  a short while.  Taking medications by mouth allows for a more constant level of medication in your blood stream for a longer period of time.      Once your pain is out of control it is harder to get back under control.  It is important you are aware when your next dose of pain medication is due.  If you are admitted, your nurse may write the time of your next dose on the white board in your room to help you remember.      We are interested in your pain and encourage you to inform us about aggravating factors during your visit.   Many times a simple repositioning every few hours can make a big difference.    If your physician says it is okay, do not let your pain prevent you from getting out of bed. Be sure to call your nurse for assistance prior to getting up so you do not fall.      Before surgery, please decide your tolerable pain goal.  These faces help describe the pain ratings we use on a 0-10 scale.   Be prepared to tell us your goal and whether or not you take pain or anxiety medications at home.          Preparing for Surgery  Preparing for surgery is an important part of your care. It can make things go more smoothly and help you avoid complications. The steps leading up to surgery may vary among hospitals. Follow all instructions given to you by your health care providers. Ask questions if you do not understand something. Talk about any concerns that you have.  Here are some questions to consider asking before your surgery:  If my surgery is not an emergency (is elective), when would be the best time to have the surgery?  What arrangements do I need to make for work, home, or school?  What will my recovery be like? How long will it be before I can return to normal activities?  Will I need to prepare my home? Will I need to arrange care for me or my children?  Should I expect to have pain after surgery? What are my pain management options? Are there nonmedical options that I can try for pain?  Tell a health  care provider about:  Any allergies you have.  All medicines you are taking, including vitamins, herbs, eye drops, creams, and over-the-counter medicines.  Any problems you or family members have had with anesthetic medicines.  Any blood disorders you have.  Any surgeries you have had.  Any medical conditions you have.  Whether you are pregnant or may be pregnant.  What are the risks?  The risks and complications of surgery depend on the specific procedure that you have. Discuss all the risks with your health care providers before your surgery. Ask about common surgical complications, which may include:  Infection.  Bleeding or a need for blood replacement (transfusion).  Allergic reactions to medicines.  Damage to surrounding nerves, tissues, or structures.  A blood clot.  Scarring.  Failure of the surgery to correct the problem.  Follow these instructions before the procedure:  Several days or weeks before your procedure  You may have a physical exam by your primary health care provider to make sure it is safe for you to have surgery.  You may have testing. This may include a chest X-ray, blood and urine tests, electrocardiogram (ECG), or other testing.  Ask your health care provider about:  Changing or stopping your regular medicines. This is especially important if you are taking diabetes medicines or blood thinners.  Taking medicines such as aspirin and ibuprofen. These medicines can thin your blood. Do not take these medicines unless your health care provider tells you to take them.  Taking over-the-counter medicines, vitamins, herbs, and supplements.  Do not use any products that contain nicotine or tobacco, such as cigarettes and e-cigarettes. If you need help quitting, ask your health care provider.  Avoid alcohol.  Ask your health care provider if there are exercises you can do to prepare for surgery.  Eat a healthy diet.   Plan to have someone take you home from the hospital or clinic.  Plan to have a  responsible adult care for you for at least 24 hours after you leave the hospital or clinic. This is important.  The day before your procedure  You may be given antibiotic medicine to take by mouth to help prevent infection. Take it as told by your health care provider.  You may be asked to shower with a germ-killing soap.  Follow instructions from your health care provider about eating and drinking restrictions. This includes gum, mints and hard candy.  Pack comfortable clothes according to your procedure.   The day of your procedure  You may need to take another shower with a germ-killing soap before you leave home in the morning.  With a small sip of water, take only the medicines that you are told to take.  Remove all jewelry including rings.   Leave anything you consider valuable at home except hearing aids if needed.  Do not wear any makeup, nail polish, powder, deodorant, lotion, hair accessories, or anything on your skin or body except your clothes.  If you will be staying in the hospital, bring a case to hold your glasses, contacts, or dentures. You may also want to bring your robe and non-skid footwear.  If you wear oxygen at home, bring it with you the day of surgery.  If instructed by your health care provider, bring your sleep apnea device with you on the day of your surgery (if this applies to you).  You may want to leave your suitcase and sleep apnea device in the car until after surgery.   Arrive at the hospital as scheduled.  Bring a friend or family member with you who can help to answer questions and be present while you meet with your health care provider.  At the hospital  When you arrive at the hospital:  Go to registration located at the main entrance of the hospital. You will be registered and given a beeper and a sticker sheet. Take the stickers to the Outpatient nurses desk and place in the black tray. This is to notify staff that you have arrived. Then return to the lobby to wait.   When  your beeper lights up and vibrates proceed through the double doors, under the stairs, and a member of the Outpatient Surgery staff will escort you to your preoperative room.  You may have to wear compression sleeves. These help to prevent blood clots and reduce swelling in your legs.  An IV may be inserted into one of your veins.              In the operating room, you may be given one or more of the following:        A medicine to help you relax (sedative).        A medicine to numb the area (local anesthetic).        A medicine to make you fall asleep (general anesthetic).        A medicine that is injected into an area of your body to numb everything below the                      injection site (regional anesthetic).  You may be given an antibiotic through your IV to help prevent infection.  Your surgical site will be marked or identified.    Contact a health care provider if you:  Develop a fever of more than 100.4°F (38°C) or other feelings of illness during the 48 hours before your surgery.  Have symptoms that get worse.  Have questions or concerns about your surgery.  Summary  Preparing for surgery can make the procedure go more smoothly and lower your risk of complications.  Before surgery, make a list of questions and concerns to discuss with your surgeon. Ask about the risks and possible complications.  In the days or weeks before your surgery, follow all instructions from your health care provider. You may need to stop smoking, avoid alcohol, follow eating restrictions, and change or stop your regular medicines.  Contact your surgeon if you develop a fever or other signs of illness during the few days before your surgery.  This information is not intended to replace advice given to you by your health care provider. Make sure you discuss any questions you have with your health care provider.  Document Revised: 12/21/2018 Document Reviewed: 10/23/2018  Elsevier Patient Education © 2021 [employer]

## 2022-11-18 ENCOUNTER — ANESTHESIA (OUTPATIENT)
Dept: GASTROENTEROLOGY | Facility: HOSPITAL | Age: 47
End: 2022-11-18

## 2022-11-18 ENCOUNTER — ANESTHESIA EVENT (OUTPATIENT)
Dept: GASTROENTEROLOGY | Facility: HOSPITAL | Age: 47
End: 2022-11-18

## 2022-11-18 ENCOUNTER — HOSPITAL ENCOUNTER (OUTPATIENT)
Facility: HOSPITAL | Age: 47
Setting detail: HOSPITAL OUTPATIENT SURGERY
Discharge: HOME | End: 2022-11-18
Attending: INTERNAL MEDICINE | Admitting: INTERNAL MEDICINE

## 2022-11-18 VITALS
HEART RATE: 66 BPM | HEIGHT: 68 IN | WEIGHT: 272 LBS | DIASTOLIC BLOOD PRESSURE: 87 MMHG | RESPIRATION RATE: 14 BRPM | OXYGEN SATURATION: 99 % | BODY MASS INDEX: 41.22 KG/M2 | TEMPERATURE: 96 F | SYSTOLIC BLOOD PRESSURE: 139 MMHG

## 2022-11-18 DIAGNOSIS — Z86.010 HISTORY OF ADENOMATOUS POLYP OF COLON: ICD-10-CM

## 2022-11-18 LAB — B-HCG UR QL: NEGATIVE

## 2022-11-18 PROCEDURE — 81025 URINE PREGNANCY TEST: CPT | Performed by: ANESTHESIOLOGY

## 2022-11-18 PROCEDURE — 88305 TISSUE EXAM BY PATHOLOGIST: CPT | Performed by: INTERNAL MEDICINE

## 2022-11-18 PROCEDURE — 25010000002 PROPOFOL 10 MG/ML EMULSION: Performed by: NURSE ANESTHETIST, CERTIFIED REGISTERED

## 2022-11-18 PROCEDURE — 45338 SIGMOIDOSCOPY W/TUMR REMOVE: CPT | Performed by: INTERNAL MEDICINE

## 2022-11-18 PROCEDURE — 45335 SIGMOIDOSCOPY W/SUBMUC INJ: CPT | Performed by: INTERNAL MEDICINE

## 2022-11-18 RX ORDER — SODIUM CHLORIDE 0.9 % (FLUSH) 0.9 %
10 SYRINGE (ML) INJECTION AS NEEDED
Status: DISCONTINUED | OUTPATIENT
Start: 2022-11-18 | End: 2022-11-18 | Stop reason: HOSPADM

## 2022-11-18 RX ORDER — SODIUM CHLORIDE 9 MG/ML
500 INJECTION, SOLUTION INTRAVENOUS CONTINUOUS PRN
Status: DISCONTINUED | OUTPATIENT
Start: 2022-11-18 | End: 2022-11-18 | Stop reason: HOSPADM

## 2022-11-18 RX ORDER — PROPOFOL 10 MG/ML
VIAL (ML) INTRAVENOUS AS NEEDED
Status: DISCONTINUED | OUTPATIENT
Start: 2022-11-18 | End: 2022-11-18 | Stop reason: SURG

## 2022-11-18 RX ADMIN — PROPOFOL 250 MG: 10 INJECTION, EMULSION INTRAVENOUS at 13:56

## 2022-11-18 RX ADMIN — SODIUM CHLORIDE 500 ML: 9 INJECTION, SOLUTION INTRAVENOUS at 12:41

## 2022-11-18 NOTE — ANESTHESIA POSTPROCEDURE EVALUATION
"Patient: Gaviota Casiano    Procedure Summary     Date: 11/18/22 Room / Location: [facility] ENDOSCOPY 5 /  [facility]    Anesthesia Start: 1352 Anesthesia Stop: 1410    Procedure: FLEXIBLE SIGMOIDOSCOPY WITH POLYPECTOMY Diagnosis:       History of adenomatous polyp of colon      (History of adenomatous polyp of colon [Z86.010])    Surgeons: Suzan Muñoz MD Provider: Erwin Almaraz CRNA    Anesthesia Type: MAC ASA Status: 3          Anesthesia Type: MAC    Vitals  Vitals Value Taken Time   /87 11/18/22 1426   Temp     Pulse 62 11/18/22 1427   Resp 14 11/18/22 1425   SpO2 99 % 11/18/22 1427   Vitals shown include unvalidated device data.        Post Anesthesia Care and Evaluation    Patient location during evaluation: PACU  Patient participation: complete - patient participated  Level of consciousness: awake and alert  Pain management: adequate    Airway patency: patent  Anesthetic complications: No anesthetic complications    Cardiovascular status: acceptable  Respiratory status: acceptable  Hydration status: acceptable    Comments: Blood pressure 139/87, pulse 66, temperature 96 °F (35.6 °C), temperature source Temporal, resp. rate 14, height 172.7 cm (68\"), weight 123 kg (272 lb), last menstrual period 11/14/2022, SpO2 99 %, not currently breastfeeding.    Pt discharged from PACU based on aditi score >8      "

## 2022-11-18 NOTE — H&P
[facility]  Gaviota Casiano  : 1975  MRN: [id number]  CSN: [id number]    History and Physical    Subjective   Gaviota Casiano is a 47 y.o. year old  who presents for surgery due to a symptomatic rectocele.  She is aware of nonsurgical treatment options.    Past Medical History:   Diagnosis Date   • Anemia    • Anxiety    • Cancer (HCC)    • History of colon polyps    • History of ovarian cancer    • History of transfusion    • Hypertension    • Hypothyroidism    • Shingles      Past Surgical History:   Procedure Laterality Date   •  SECTION     • CHOLECYSTECTOMY     • COLONOSCOPY N/A 2017    One 5mm polyp in the transverse colon-path shows cauterized hyperplastic mucosa; The entire examined colon is normal-biopsied; Repeat 5 years   • COLONOSCOPY N/A 2022    Nodule in the distal rectum-biopsied; Non-bleeding internal hemorrhoids; Repeat 5 years   • ENDOSCOPY N/A 2017    Normal esophagus; Behzad-en-Y gastrojejunostomy with gastrojejunal anastomosis characterized by healthy appearing mucosa-biopsied; A single non-bleeding angiodysplastic lesion in the jejunum   • GASTRIC BYPASS     • LAPAROTOMY OOPHERECTOMY Right     For ovarian cancer   • MYRINGOTOMY W/ TUBES     • NOSE SURGERY     • TONSILLECTOMY       Social History    Tobacco Use      Smoking status: Never      Smokeless tobacco: Never      Current Outpatient Medications:   •  Ascorbic Acid (Vitamin C) 500 MG capsule, Take 1 capsule by mouth Daily., Disp: , Rfl:   •  Ashwagandha 500 MG capsule, Take 1 capsule by mouth Daily., Disp: , Rfl:   •  calcium carbonate (OS-GIULIANA) 600 MG tablet, Take 600 mg by mouth Daily., Disp: , Rfl:   •  Cyanocobalamin (B-12 COMPLIANCE INJECTION IJ), Inject 1 dose as directed Every 30 (Thirty) Days., Disp: , Rfl:   •  ferrous sulfate 325 (65 FE) MG tablet, Take 650 mg by mouth Every Night., Disp: , Rfl:   •  multivitamin with minerals tablet tablet, Take 1 tablet by mouth Daily., Disp:  ", Rfl:   •  Synthroid 137 MCG tablet, Take 137 mcg by mouth Daily., Disp: , Rfl:   •  Zinc 50 MG capsule, Take 1 capsule by mouth Daily., Disp: , Rfl:   •  PREBIOTIC PRODUCT PO, Take 1 tablet by mouth Daily., Disp: , Rfl:   •  saccharomyces boulardii (FLORASTOR) 250 MG capsule, Take 250 mg by mouth 2 (Two) Times a Day., Disp: , Rfl:     Allergies   Allergen Reactions   • Darvon [Propoxyphene] Hives     Associated with Darvocet which has been pulled from shelve.   • Penicillins Hives   • Percocet [Oxycodone-Acetaminophen] Hives       Family History   Problem Relation Age of Onset   • Diabetes Mother    • Coronary artery disease Mother    • Stroke Mother    • Diabetes Father    • Coronary artery disease Father    • Breast cancer Father 50        Had genic testing.    • No Known Problems Sister    • Ovarian cancer Sister 20   • No Known Problems Brother    • No Known Problems Brother    • No Known Problems Brother    • No Known Problems Daughter    • Colon cancer Neg Hx    • Colon polyps Neg Hx    • Esophageal cancer Neg Hx    • Liver cancer Neg Hx    • Liver disease Neg Hx    • Stomach cancer Neg Hx    • Rectal cancer Neg Hx      Review of Systems   Constitutional: Negative for unexpected weight change.   HENT: Negative for trouble swallowing.    Respiratory: Negative for shortness of breath.    Cardiovascular: Negative for chest pain.   Musculoskeletal: Negative for neck stiffness.   Neurological: Negative for seizures.   Hematological: Does not bruise/bleed easily.         Objective   /86 (BP Location: Left arm, Patient Position: Sitting)   Ht 172.7 cm (68\")   Wt 124 kg (274 lb)   LMP 11/14/2022 (Approximate)   BMI 41.66 kg/m²     Physical Exam   Physical Exam  Vitals reviewed.   Constitutional:       Appearance: She is well-developed.   HENT:      Head: Normocephalic and atraumatic.   Eyes:      General: No scleral icterus.  Neck:      Trachea: No tracheal deviation.   Cardiovascular:      Rate and " Rhythm: Normal rate and regular rhythm.   Pulmonary:      Effort: Pulmonary effort is normal.      Breath sounds: Normal breath sounds.   Abdominal:      General: Bowel sounds are normal. There is no distension.      Palpations: Abdomen is soft.      Tenderness: There is no abdominal tenderness.   Genitourinary:     Exam position: Supine.      Labia:         Right: No lesion.         Left: No lesion.       Vagina: No vaginal discharge or bleeding.      Cervix: No cervical motion tenderness.      Uterus: Not enlarged, not fixed and not tender.       Adnexa:         Right: No mass.          Left: No mass.        Rectum: No external hemorrhoid.      Comments: Grade 2-3 rectocele  Skin:     General: Skin is warm and dry.   Neurological:      Mental Status: She is alert and oriented to person, place, and time.         Labs  Lab Results   Component Value Date     11/17/2022    HGB 13.0 11/17/2022    HCT 40.4 11/17/2022    WBC 5.47 11/17/2022     11/17/2022    K 4.2 11/17/2022     11/17/2022    CO2 29.0 11/17/2022    BUN 20 11/17/2022    CREATININE 0.78 11/17/2022    GLUCOSE 90 11/17/2022    ALBUMIN 3.80 02/15/2020    CALCIUM 9.3 11/17/2022    AST 17 02/15/2020    ALT 17 02/15/2020    BILITOT 0.3 02/15/2020        Assessment & Plan    Diagnoses and all orders for this visit:    1. Rectocele (Primary)    Risks, benefits, and alternatives of a posterior vaginal repair were discussed with the patient in detail. Intraoperative risks of bleeding and damage to surrounding organs, including but not limited to intestine, bladder and ureter, were explained. Management of these were also explained. Postoperative complications such as infection, pneumonia, DVT, and bleeding were explained. The importance of compliance with postoperative restrictions was discussed.     All of the patient's questions were answered to her satisfaction. She was encouraged to return for an additional appointment if she had further  questions. She verbalized understanding of the above and wished to proceed with the outlined plan.    John Figueroa MD  11/17/2022  19:06 CST

## 2022-11-18 NOTE — ANESTHESIA PREPROCEDURE EVALUATION
Anesthesia Evaluation     Patient summary reviewed   no history of anesthetic complications:  NPO Solid Status: > 8 hours  NPO Liquid Status: > 8 hours           Airway   Mallampati: I  TM distance: >3 FB  Neck ROM: full  No difficulty expected  Dental      Pulmonary - negative pulmonary ROS   Cardiovascular   Exercise tolerance: excellent (>7 METS)    (-) hypertension      Neuro/Psych- negative ROS  GI/Hepatic/Renal/Endo    (+) obesity, morbid obesity, GI bleeding resolved, thyroid problem hypothyroidism  (-) liver disease, no renal disease, diabetes    Musculoskeletal (-) negative ROS    Abdominal    Substance History   (+) alcohol use,      OB/GYN          Other      history of cancer remission                      Anesthesia Plan    ASA 3     MAC     intravenous induction     Anesthetic plan, risks, benefits, and alternatives have been provided, discussed and informed consent has been obtained with: patient.        CODE STATUS:

## 2022-11-27 ENCOUNTER — APPOINTMENT (OUTPATIENT)
Dept: CT IMAGING | Age: 47
End: 2022-11-27
Payer: COMMERCIAL

## 2022-11-27 ENCOUNTER — HOSPITAL ENCOUNTER (EMERGENCY)
Age: 47
Discharge: HOME | End: 2022-11-27
Attending: EMERGENCY MEDICINE
Payer: COMMERCIAL

## 2022-11-27 VITALS
WEIGHT: 274 LBS | OXYGEN SATURATION: 93 % | DIASTOLIC BLOOD PRESSURE: 79 MMHG | HEART RATE: 61 BPM | SYSTOLIC BLOOD PRESSURE: 124 MMHG | BODY MASS INDEX: 41.52 KG/M2 | TEMPERATURE: 97.9 F | HEIGHT: 68 IN | RESPIRATION RATE: 15 BRPM

## 2022-11-27 DIAGNOSIS — S06.0X0A CONCUSSION WITHOUT LOSS OF CONSCIOUSNESS, INITIAL ENCOUNTER: Primary | ICD-10-CM

## 2022-11-27 DIAGNOSIS — F07.81 POST CONCUSSION SYNDROME: ICD-10-CM

## 2022-11-27 DIAGNOSIS — R03.0 ELEVATED BLOOD PRESSURE READING: ICD-10-CM

## 2022-11-27 DIAGNOSIS — E04.1 THYROID NODULE: ICD-10-CM

## 2022-11-27 LAB
ALBUMIN SERPL-MCNC: 3.6 G/DL (ref 3.5–5.2)
ALP BLD-CCNC: 84 U/L (ref 35–104)
ALT SERPL-CCNC: 24 U/L (ref 5–33)
ANION GAP SERPL CALCULATED.3IONS-SCNC: 10 MMOL/L (ref 7–19)
AST SERPL-CCNC: 23 U/L (ref 5–32)
BILIRUB SERPL-MCNC: <0.2 MG/DL (ref 0.2–1.2)
BUN BLDV-MCNC: 20 MG/DL (ref 6–20)
CALCIUM SERPL-MCNC: 8.7 MG/DL (ref 8.6–10)
CHLORIDE BLD-SCNC: 107 MMOL/L (ref 98–111)
CO2: 23 MMOL/L (ref 22–29)
CREAT SERPL-MCNC: 0.8 MG/DL (ref 0.5–0.9)
GFR SERPL CREATININE-BSD FRML MDRD: >60 ML/MIN/{1.73_M2}
GLUCOSE BLD-MCNC: 104 MG/DL (ref 74–109)
HCT VFR BLD CALC: 38.3 % (ref 37–47)
HEMOGLOBIN: 12.7 G/DL (ref 12–16)
MCH RBC QN AUTO: 31.8 PG (ref 27–31)
MCHC RBC AUTO-ENTMCNC: 33.2 G/DL (ref 33–37)
MCV RBC AUTO: 96 FL (ref 81–99)
PDW BLD-RTO: 13.2 % (ref 11.5–14.5)
PLATELET # BLD: 238 K/UL (ref 130–400)
PMV BLD AUTO: 10.2 FL (ref 9.4–12.3)
POTASSIUM REFLEX MAGNESIUM: 4.4 MMOL/L (ref 3.5–5)
RBC # BLD: 3.99 M/UL (ref 4.2–5.4)
SEDIMENTATION RATE, ERYTHROCYTE: 15 MM/HR (ref 0–20)
SODIUM BLD-SCNC: 140 MMOL/L (ref 136–145)
TOTAL PROTEIN: 6.4 G/DL (ref 6.6–8.7)
WBC # BLD: 6.9 K/UL (ref 4.8–10.8)

## 2022-11-27 PROCEDURE — 36415 COLL VENOUS BLD VENIPUNCTURE: CPT

## 2022-11-27 PROCEDURE — 85027 COMPLETE CBC AUTOMATED: CPT

## 2022-11-27 PROCEDURE — 70450 CT HEAD/BRAIN W/O DYE: CPT | Performed by: RADIOLOGY

## 2022-11-27 PROCEDURE — 72125 CT NECK SPINE W/O DYE: CPT

## 2022-11-27 PROCEDURE — 6360000002 HC RX W HCPCS: Performed by: EMERGENCY MEDICINE

## 2022-11-27 PROCEDURE — 99284 EMERGENCY DEPT VISIT MOD MDM: CPT

## 2022-11-27 PROCEDURE — 85652 RBC SED RATE AUTOMATED: CPT

## 2022-11-27 PROCEDURE — 70450 CT HEAD/BRAIN W/O DYE: CPT

## 2022-11-27 PROCEDURE — 80053 COMPREHEN METABOLIC PANEL: CPT

## 2022-11-27 PROCEDURE — 72125 CT NECK SPINE W/O DYE: CPT | Performed by: RADIOLOGY

## 2022-11-27 PROCEDURE — 96374 THER/PROPH/DIAG INJ IV PUSH: CPT

## 2022-11-27 PROCEDURE — 96375 TX/PRO/DX INJ NEW DRUG ADDON: CPT

## 2022-11-27 RX ORDER — DIPHENHYDRAMINE HYDROCHLORIDE 50 MG/ML
25 INJECTION INTRAMUSCULAR; INTRAVENOUS ONCE
Status: COMPLETED | OUTPATIENT
Start: 2022-11-27 | End: 2022-11-27

## 2022-11-27 RX ORDER — METOCLOPRAMIDE HYDROCHLORIDE 5 MG/ML
10 INJECTION INTRAMUSCULAR; INTRAVENOUS ONCE
Status: COMPLETED | OUTPATIENT
Start: 2022-11-27 | End: 2022-11-27

## 2022-11-27 RX ORDER — ONDANSETRON 4 MG/1
4 TABLET, ORALLY DISINTEGRATING ORAL EVERY 8 HOURS PRN
Qty: 10 TABLET | Refills: 0 | Status: SHIPPED | OUTPATIENT
Start: 2022-11-27

## 2022-11-27 RX ORDER — ONDANSETRON 2 MG/ML
4 INJECTION INTRAMUSCULAR; INTRAVENOUS ONCE
Status: COMPLETED | OUTPATIENT
Start: 2022-11-27 | End: 2022-11-27

## 2022-11-27 RX ADMIN — ONDANSETRON 4 MG: 2 INJECTION INTRAMUSCULAR; INTRAVENOUS at 20:43

## 2022-11-27 RX ADMIN — DIPHENHYDRAMINE HYDROCHLORIDE 25 MG: 50 INJECTION, SOLUTION INTRAMUSCULAR; INTRAVENOUS at 20:48

## 2022-11-27 RX ADMIN — METOCLOPRAMIDE 10 MG: 5 INJECTION, SOLUTION INTRAMUSCULAR; INTRAVENOUS at 20:45

## 2022-11-27 ASSESSMENT — ENCOUNTER SYMPTOMS
SHORTNESS OF BREATH: 0
EYE PAIN: 0
NAUSEA: 1
ABDOMINAL PAIN: 0
VOMITING: 0
DIARRHEA: 0

## 2022-11-27 ASSESSMENT — PAIN DESCRIPTION - LOCATION: LOCATION: HEAD

## 2022-11-27 ASSESSMENT — PAIN SCALES - GENERAL: PAINLEVEL_OUTOF10: 9

## 2022-11-27 ASSESSMENT — PAIN - FUNCTIONAL ASSESSMENT: PAIN_FUNCTIONAL_ASSESSMENT: 0-10

## 2022-11-28 NOTE — ED PROVIDER NOTES
[facility]  eMERGENCY dEPARTMENT eNCOUnter      Pt Name: Tim Thompson  MRN: [id number]  [place] 1975  Date of evaluation: 11/27/2022  Provider: Franco Celeste MD    CHIEF COMPLAINT       Chief Complaint   Patient presents with    Head Injury    Headache     Pt states hit her head on paper towel dispenser twice in one setting about 2 weeks ago and ever since then it has gotten worse especially in the last 4-5 days with nausea          HISTORY OF PRESENT ILLNESS   (Location/Symptom, Timing/Onset,Context/Setting, Quality, Duration, Modifying Factors, Severity)  Note limiting factors. Tim Thompson is a 49 y.o. female who presents to the emergency department due to headache following head injury. Patient states she hit her head on a paper towel dispenser about 2 weeks ago. She was in her doctor's office when she did this and accidentally did it twice in the span of a few minutes. She did not have LOC but she said she felt very dizzy and lightheaded after it happened and struck her head quite hard. Was on the left parietal portion of her head. Since then has had persistent headache on the left side. Headache has gradually worsened since the injury. Nausea. No vomiting. Has felt a little dizzy. Headache mostly localized to the area where she had a head injury but occasionally radiates to the other side of her head. Also radiates to her neck at times. No fevers. Does not take any kind of anticoagulants or antiplatelets. HPI    NursingNotes were reviewed. REVIEW OF SYSTEMS    (2-9 systems for level 4, 10 or more for level 5)     Review of Systems   Constitutional:  Negative for fever. Eyes:  Negative for pain and visual disturbance. Respiratory:  Negative for shortness of breath. Cardiovascular:  Negative for chest pain and palpitations. Gastrointestinal:  Positive for nausea. Negative for abdominal pain, diarrhea and vomiting. Genitourinary:  Negative for dysuria. Musculoskeletal:  Positive for neck pain (pain in left side of head radiates down to neck). Negative for neck stiffness. Skin:  Negative for rash. Neurological:  Positive for dizziness and headaches. Negative for syncope, speech difficulty, weakness and numbness. All other systems reviewed and are negative. A complete review of systems was performed and is negative except as noted above in the HPI. PAST MEDICAL HISTORY   History reviewed. No pertinent past medical history. SURGICAL HISTORY     History reviewed. No pertinent surgical history. CURRENT MEDICATIONS       Previous Medications    No medications on file       ALLERGIES     Penicillins and Percocet [oxycodone-acetaminophen]    FAMILY HISTORY     History reviewed. No pertinent family history. SOCIAL HISTORY       Social History     Socioeconomic History    Marital status:      Spouse name: None    Number of children: None    Years of education: None    Highest education level: None   Tobacco Use    Smoking status: Never     Passive exposure: Never    Smokeless tobacco: Never   Vaping Use    Vaping Use: Never used   Substance and Sexual Activity    Alcohol use: Yes     Comment: occ    Drug use: Never    Sexual activity: Yes       SCREENINGS    [place] Coma Scale  Eye Opening: Spontaneous  Best Verbal Response: Oriented  Best Motor Response: Obeys commands  Emmanuel Coma Scale Score: 15        PHYSICAL EXAM    (up to 7 for level 4, 8 or more for level 5)     ED Triage Vitals [11/27/22 1514]   BP Temp Temp src Heart Rate Resp SpO2 Height Weight   (!) 152/100 97.8 °F (36.6 °C) -- 90 18 99 % 5' 8\" (1.727 m) 274 lb (124.3 kg)       Physical Exam  Vitals reviewed. Constitutional:       General: She is not in acute distress. Appearance: She is well-developed. HENT:      Head: Normocephalic and atraumatic.  No raccoon eyes, Davis's sign, abrasion, contusion, right periorbital erythema, left periorbital erythema or laceration. Jaw: There is normal jaw occlusion. No trismus, tenderness, swelling, pain on movement or malocclusion. Right Ear: Hearing, tympanic membrane, ear canal and external ear normal. No hemotympanum. Left Ear: Hearing, tympanic membrane, ear canal and external ear normal. No hemotympanum. Nose: Nose normal.      Mouth/Throat:      Mouth: Mucous membranes are moist.      Pharynx: Oropharynx is clear. Eyes:      General: No scleral icterus. Right eye: No discharge. Left eye: No discharge. Extraocular Movements: Extraocular movements intact. Conjunctiva/sclera: Conjunctivae normal.      Pupils: Pupils are equal, round, and reactive to light. Visual Fields: Right eye visual fields normal and left eye visual fields normal.   Neck:      Vascular: No JVD. Trachea: Trachea and phonation normal.   Cardiovascular:      Rate and Rhythm: Normal rate and regular rhythm. Heart sounds: Normal heart sounds. Pulmonary:      Effort: Pulmonary effort is normal. No respiratory distress. Breath sounds: Normal breath sounds. Abdominal:      General: There is no distension. Palpations: Abdomen is soft. Tenderness: There is no abdominal tenderness. There is no guarding or rebound. Musculoskeletal:         General: No tenderness, deformity or signs of injury. Normal range of motion. Cervical back: Normal range of motion and neck supple. No edema, erythema, rigidity or crepitus. Muscular tenderness present. No spinous process tenderness. Normal range of motion. Right lower leg: No edema. Left lower leg: No edema. Skin:     General: Skin is warm and dry. Capillary Refill: Capillary refill takes less than 2 seconds. Neurological:      General: No focal deficit present. Mental Status: She is alert and oriented to person, place, and time. GCS: GCS eye subscore is 4. GCS verbal subscore is 5. GCS motor subscore is 6. Cranial Nerves: Cranial nerves 2-12 are intact. Sensory: Sensation is intact. Motor: Motor function is intact. Coordination: Coordination is intact. Psychiatric:         Mood and Affect: Mood normal.         Behavior: Behavior normal.       DIAGNOSTIC RESULTS     EKG: All EKG's are interpreted by the Emergency Department Physician who either signs or Co-signs this chart in the absence of a cardiologist.        RADIOLOGY:   Non-plain film images such as CT, Ultrasound and MRI are read by the radiologist. Plainradiographic images are visualized and preliminarily interpreted by the emergency physician with the below findings:        Interpretation per the Radiologist below, if available at the time of this note:    CT CERVICAL SPINE WO CONTRAST   Final Result   [de-identified]. No acute fractures or subluxations of the visualized cervical spine appreciated. [de-identified]. Straightening and a partial reversal of cervical lordosis can relate to muscle spasm or positioning. [de-identified]. Scattered multilevel spondylosis, as described. [de-identified]. Small left thyroid nodules noted, suggest a follow-up ultrasound. Recommendation: As above. All CT scans at this facility utilize dose modulation, iterative reconstruction, and/or weight based dosing when appropriate to reduce radiation dose to as low as reasonably achievable. Electronically Signed by Jackie Remy MD at 27-Nov-2022 11:06:00 PM EST               CT HEAD WO CONTRAST   Final Result   1. No acute intracranial process appreciated, with findings as above. 2. Mucosal polyp in the right maxillary sinus. Recommendation: Follow up as clinically indicated. All CT scans at this facility utilize dose modulation, iterative reconstruction, and/or weight based dosing when appropriate to reduce radiation dose to as low as reasonably achievable.     Electronically Signed by Jackie Remy MD at 27-Nov-2022 11:01:25 PM EST                     ED BEDSIDE ULTRASOUND:   Performed by ED Physician - none    LABS:  Labs Reviewed   CBC - Abnormal; Notable for the following components:       Result Value    RBC 3.99 (*)     MCH 31.8 (*)     All other components within normal limits   COMPREHENSIVE METABOLIC PANEL W/ REFLEX TO MG FOR LOW K - Abnormal; Notable for the following components: Total Protein 6.4 (*)     All other components within normal limits   SEDIMENTATION RATE       All other labs were within normal range or not returned as of this dictation. EMERGENCY DEPARTMENT COURSE and DIFFERENTIALDIAGNOSIS/MDM:   Vitals:    Vitals:    11/27/22 1514   BP: (!) 152/100   Pulse: 90   Resp: 18   Temp: 97.8 °F (36.6 °C)   SpO2: 99%   Weight: 274 lb (124.3 kg)   Height: 5' 8\" (1.727 m)       MDM    Patient resting comfortably. In no distress. Nontoxic on exam at this time. Symptoms better after Reglan, Benadryl and Zofran. Tolerating oral intake. No vomiting. Ambulating in department without any problems. Stable for discharge. See no indication for admission. Recommend patient follow-up with primary care for further evaluation. Told patient suspect her symptoms are probably due to postconcussive syndrome. Told return to the ER for any change worsening symptoms or new concerns. Patient agreeable plan. CONSULTS:  None    PROCEDURES:  Unless otherwise notedbelow, none     Procedures    FINAL IMPRESSION     1. Concussion without loss of consciousness, initial encounter    2. Post concussion syndrome    3. Thyroid nodule    4.  Elevated blood pressure reading          DISPOSITION/PLAN   DISPOSITION Decision To Discharge 11/27/2022 10:14:56 PM      PATIENT REFERRED TO:  @FUP@    DISCHARGE MEDICATIONS:  New Prescriptions    ONDANSETRON (ZOFRAN ODT) 4 MG DISINTEGRATING TABLET    Take 1 tablet by mouth every 8 hours as needed for Nausea or Vomiting          (Please note that portions of this note were completed with a voice recognition program.  Efforts were made to edit the dictations butoccasionally words are mis-transcribed.)    Rizwan Baker MD (electronically signed)  AttendingEmergency Physician          Rizwan Baker MD  11/27/22 4958

## 2022-11-30 ENCOUNTER — ANESTHESIA (OUTPATIENT)
Dept: PERIOP | Facility: HOSPITAL | Age: 47
End: 2022-11-30

## 2022-11-30 ENCOUNTER — HOSPITAL ENCOUNTER (OUTPATIENT)
Facility: HOSPITAL | Age: 47
Setting detail: HOSPITAL OUTPATIENT SURGERY
Discharge: HOME | End: 2022-11-30
Attending: OBSTETRICS & GYNECOLOGY | Admitting: OBSTETRICS & GYNECOLOGY

## 2022-11-30 ENCOUNTER — ANESTHESIA EVENT (OUTPATIENT)
Dept: PERIOP | Facility: HOSPITAL | Age: 47
End: 2022-11-30

## 2022-11-30 VITALS
HEART RATE: 59 BPM | OXYGEN SATURATION: 96 % | SYSTOLIC BLOOD PRESSURE: 103 MMHG | TEMPERATURE: 97.4 F | RESPIRATION RATE: 16 BRPM | DIASTOLIC BLOOD PRESSURE: 67 MMHG

## 2022-11-30 DIAGNOSIS — N81.6 RECTOCELE: ICD-10-CM

## 2022-11-30 LAB — B-HCG UR QL: NEGATIVE

## 2022-11-30 PROCEDURE — 57250 REPAIR RECTUM & VAGINA: CPT | Performed by: OBSTETRICS & GYNECOLOGY

## 2022-11-30 PROCEDURE — 25010000002 ONDANSETRON PER 1 MG: Performed by: NURSE ANESTHETIST, CERTIFIED REGISTERED

## 2022-11-30 PROCEDURE — 81025 URINE PREGNANCY TEST: CPT | Performed by: OBSTETRICS & GYNECOLOGY

## 2022-11-30 PROCEDURE — 25010000002 FENTANYL CITRATE (PF) 250 MCG/5ML SOLUTION: Performed by: NURSE ANESTHETIST, CERTIFIED REGISTERED

## 2022-11-30 PROCEDURE — 25010000002 DROPERIDOL PER 5 MG: Performed by: NURSE ANESTHETIST, CERTIFIED REGISTERED

## 2022-11-30 PROCEDURE — 25010000002 PROPOFOL 10 MG/ML EMULSION: Performed by: NURSE ANESTHETIST, CERTIFIED REGISTERED

## 2022-11-30 PROCEDURE — 25010000002 CEFAZOLIN PER 500 MG: Performed by: OBSTETRICS & GYNECOLOGY

## 2022-11-30 PROCEDURE — 25010000002 MIDAZOLAM PER 1 MG: Performed by: ANESTHESIOLOGY

## 2022-11-30 RX ORDER — HYDROCODONE BITARTRATE AND ACETAMINOPHEN 7.5; 325 MG/1; MG/1
2 TABLET ORAL EVERY 4 HOURS PRN
Status: DISCONTINUED | OUTPATIENT
Start: 2022-11-30 | End: 2022-11-30 | Stop reason: HOSPADM

## 2022-11-30 RX ORDER — SODIUM CHLORIDE 9 MG/ML
INJECTION, SOLUTION INTRAVENOUS AS NEEDED
Status: DISCONTINUED | OUTPATIENT
Start: 2022-11-30 | End: 2022-11-30 | Stop reason: HOSPADM

## 2022-11-30 RX ORDER — VECURONIUM BROMIDE 1 MG/ML
INJECTION, POWDER, LYOPHILIZED, FOR SOLUTION INTRAVENOUS AS NEEDED
Status: DISCONTINUED | OUTPATIENT
Start: 2022-11-30 | End: 2022-11-30 | Stop reason: SURG

## 2022-11-30 RX ORDER — SODIUM CHLORIDE 0.9 % (FLUSH) 0.9 %
3 SYRINGE (ML) INJECTION AS NEEDED
Status: DISCONTINUED | OUTPATIENT
Start: 2022-11-30 | End: 2022-11-30 | Stop reason: HOSPADM

## 2022-11-30 RX ORDER — CEFAZOLIN SODIUM IN 0.9 % NACL 3 G/100 ML
3 INTRAVENOUS SOLUTION, PIGGYBACK (ML) INTRAVENOUS ONCE
Status: COMPLETED | OUTPATIENT
Start: 2022-11-30 | End: 2022-11-30

## 2022-11-30 RX ORDER — SODIUM CHLORIDE, SODIUM LACTATE, POTASSIUM CHLORIDE, CALCIUM CHLORIDE 600; 310; 30; 20 MG/100ML; MG/100ML; MG/100ML; MG/100ML
100 INJECTION, SOLUTION INTRAVENOUS CONTINUOUS
Status: DISCONTINUED | OUTPATIENT
Start: 2022-11-30 | End: 2022-11-30 | Stop reason: HOSPADM

## 2022-11-30 RX ORDER — SODIUM CHLORIDE 0.9 % (FLUSH) 0.9 %
1-10 SYRINGE (ML) INJECTION AS NEEDED
Status: DISCONTINUED | OUTPATIENT
Start: 2022-11-30 | End: 2022-11-30 | Stop reason: HOSPADM

## 2022-11-30 RX ORDER — LABETALOL HYDROCHLORIDE 5 MG/ML
5 INJECTION, SOLUTION INTRAVENOUS
Status: DISCONTINUED | OUTPATIENT
Start: 2022-11-30 | End: 2022-11-30 | Stop reason: HOSPADM

## 2022-11-30 RX ORDER — HYDROCODONE BITARTRATE AND ACETAMINOPHEN 10; 325 MG/1; MG/1
1 TABLET ORAL ONCE AS NEEDED
Status: COMPLETED | OUTPATIENT
Start: 2022-11-30 | End: 2022-11-30

## 2022-11-30 RX ORDER — ONDANSETRON 2 MG/ML
4 INJECTION INTRAMUSCULAR; INTRAVENOUS ONCE AS NEEDED
Status: DISCONTINUED | OUTPATIENT
Start: 2022-11-30 | End: 2022-11-30 | Stop reason: HOSPADM

## 2022-11-30 RX ORDER — DROPERIDOL 2.5 MG/ML
INJECTION, SOLUTION INTRAMUSCULAR; INTRAVENOUS AS NEEDED
Status: DISCONTINUED | OUTPATIENT
Start: 2022-11-30 | End: 2022-11-30 | Stop reason: SURG

## 2022-11-30 RX ORDER — DROPERIDOL 2.5 MG/ML
0.62 INJECTION, SOLUTION INTRAMUSCULAR; INTRAVENOUS ONCE AS NEEDED
Status: DISCONTINUED | OUTPATIENT
Start: 2022-11-30 | End: 2022-11-30 | Stop reason: HOSPADM

## 2022-11-30 RX ORDER — MIDAZOLAM HYDROCHLORIDE 1 MG/ML
1 INJECTION INTRAMUSCULAR; INTRAVENOUS
Status: COMPLETED | OUTPATIENT
Start: 2022-11-30 | End: 2022-11-30

## 2022-11-30 RX ORDER — FENTANYL CITRATE 50 UG/ML
INJECTION, SOLUTION INTRAMUSCULAR; INTRAVENOUS AS NEEDED
Status: DISCONTINUED | OUTPATIENT
Start: 2022-11-30 | End: 2022-11-30 | Stop reason: SURG

## 2022-11-30 RX ORDER — EPHEDRINE SULFATE 50 MG/ML
INJECTION, SOLUTION INTRAVENOUS AS NEEDED
Status: DISCONTINUED | OUTPATIENT
Start: 2022-11-30 | End: 2022-11-30 | Stop reason: SURG

## 2022-11-30 RX ORDER — LIDOCAINE HYDROCHLORIDE 20 MG/ML
INJECTION, SOLUTION EPIDURAL; INFILTRATION; INTRACAUDAL; PERINEURAL AS NEEDED
Status: DISCONTINUED | OUTPATIENT
Start: 2022-11-30 | End: 2022-11-30 | Stop reason: SURG

## 2022-11-30 RX ORDER — TRAMADOL HYDROCHLORIDE 50 MG/1
50 TABLET ORAL EVERY 6 HOURS PRN
Status: DISCONTINUED | OUTPATIENT
Start: 2022-11-30 | End: 2022-11-30 | Stop reason: HOSPADM

## 2022-11-30 RX ORDER — FLUMAZENIL 0.1 MG/ML
0.2 INJECTION INTRAVENOUS AS NEEDED
Status: DISCONTINUED | OUTPATIENT
Start: 2022-11-30 | End: 2022-11-30 | Stop reason: HOSPADM

## 2022-11-30 RX ORDER — SODIUM CHLORIDE 0.9 % (FLUSH) 0.9 %
3 SYRINGE (ML) INJECTION EVERY 12 HOURS SCHEDULED
Status: DISCONTINUED | OUTPATIENT
Start: 2022-11-30 | End: 2022-11-30 | Stop reason: HOSPADM

## 2022-11-30 RX ORDER — ACETAMINOPHEN 500 MG
1000 TABLET ORAL ONCE
Status: COMPLETED | OUTPATIENT
Start: 2022-11-30 | End: 2022-11-30

## 2022-11-30 RX ORDER — SODIUM CHLORIDE, SODIUM LACTATE, POTASSIUM CHLORIDE, CALCIUM CHLORIDE 600; 310; 30; 20 MG/100ML; MG/100ML; MG/100ML; MG/100ML
1000 INJECTION, SOLUTION INTRAVENOUS CONTINUOUS
Status: DISCONTINUED | OUTPATIENT
Start: 2022-11-30 | End: 2022-11-30 | Stop reason: HOSPADM

## 2022-11-30 RX ORDER — NALOXONE HCL 0.4 MG/ML
0.4 VIAL (ML) INJECTION AS NEEDED
Status: DISCONTINUED | OUTPATIENT
Start: 2022-11-30 | End: 2022-11-30 | Stop reason: HOSPADM

## 2022-11-30 RX ORDER — SODIUM CHLORIDE 0.9 % (FLUSH) 0.9 %
10 SYRINGE (ML) INJECTION EVERY 12 HOURS SCHEDULED
Status: DISCONTINUED | OUTPATIENT
Start: 2022-11-30 | End: 2022-11-30 | Stop reason: HOSPADM

## 2022-11-30 RX ORDER — SODIUM CHLORIDE 0.9 % (FLUSH) 0.9 %
3-10 SYRINGE (ML) INJECTION AS NEEDED
Status: DISCONTINUED | OUTPATIENT
Start: 2022-11-30 | End: 2022-11-30 | Stop reason: HOSPADM

## 2022-11-30 RX ORDER — PROPOFOL 10 MG/ML
VIAL (ML) INTRAVENOUS AS NEEDED
Status: DISCONTINUED | OUTPATIENT
Start: 2022-11-30 | End: 2022-11-30 | Stop reason: SURG

## 2022-11-30 RX ORDER — LIDOCAINE HYDROCHLORIDE 10 MG/ML
0.5 INJECTION, SOLUTION EPIDURAL; INFILTRATION; INTRACAUDAL; PERINEURAL ONCE AS NEEDED
Status: DISCONTINUED | OUTPATIENT
Start: 2022-11-30 | End: 2022-11-30 | Stop reason: HOSPADM

## 2022-11-30 RX ORDER — IBUPROFEN 600 MG/1
600 TABLET ORAL ONCE AS NEEDED
Status: DISCONTINUED | OUTPATIENT
Start: 2022-11-30 | End: 2022-11-30 | Stop reason: HOSPADM

## 2022-11-30 RX ORDER — ONDANSETRON 2 MG/ML
INJECTION INTRAMUSCULAR; INTRAVENOUS AS NEEDED
Status: DISCONTINUED | OUTPATIENT
Start: 2022-11-30 | End: 2022-11-30 | Stop reason: SURG

## 2022-11-30 RX ORDER — SODIUM CHLORIDE 9 MG/ML
100 INJECTION, SOLUTION INTRAVENOUS CONTINUOUS
Status: DISCONTINUED | OUTPATIENT
Start: 2022-11-30 | End: 2022-11-30 | Stop reason: HOSPADM

## 2022-11-30 RX ORDER — FENTANYL CITRATE 50 UG/ML
25 INJECTION, SOLUTION INTRAMUSCULAR; INTRAVENOUS
Status: DISCONTINUED | OUTPATIENT
Start: 2022-11-30 | End: 2022-11-30 | Stop reason: HOSPADM

## 2022-11-30 RX ORDER — SODIUM CHLORIDE 9 MG/ML
40 INJECTION, SOLUTION INTRAVENOUS AS NEEDED
Status: DISCONTINUED | OUTPATIENT
Start: 2022-11-30 | End: 2022-11-30 | Stop reason: HOSPADM

## 2022-11-30 RX ORDER — TRAMADOL HYDROCHLORIDE 50 MG/1
50 TABLET ORAL EVERY 6 HOURS PRN
Qty: 8 TABLET | Refills: 0 | Status: SHIPPED | OUTPATIENT
Start: 2022-11-30 | End: 2022-12-15

## 2022-11-30 RX ORDER — LIDOCAINE HYDROCHLORIDE AND EPINEPHRINE 10; 10 MG/ML; UG/ML
INJECTION, SOLUTION INFILTRATION; PERINEURAL AS NEEDED
Status: DISCONTINUED | OUTPATIENT
Start: 2022-11-30 | End: 2022-11-30 | Stop reason: HOSPADM

## 2022-11-30 RX ADMIN — EPHEDRINE SULFATE 10 MG: 50 INJECTION INTRAVENOUS at 07:23

## 2022-11-30 RX ADMIN — FENTANYL CITRATE 250 MCG: 0.05 INJECTION, SOLUTION INTRAMUSCULAR; INTRAVENOUS at 07:06

## 2022-11-30 RX ADMIN — EPHEDRINE SULFATE 5 MG: 50 INJECTION INTRAVENOUS at 07:16

## 2022-11-30 RX ADMIN — SODIUM CHLORIDE, POTASSIUM CHLORIDE, SODIUM LACTATE AND CALCIUM CHLORIDE 100 ML/HR: 600; 310; 30; 20 INJECTION, SOLUTION INTRAVENOUS at 06:37

## 2022-11-30 RX ADMIN — LIDOCAINE HYDROCHLORIDE 200 MG: 20 INJECTION, SOLUTION EPIDURAL; INFILTRATION; INTRACAUDAL; PERINEURAL at 07:06

## 2022-11-30 RX ADMIN — PROPOFOL 200 MG: 10 INJECTION, EMULSION INTRAVENOUS at 07:06

## 2022-11-30 RX ADMIN — MIDAZOLAM HYDROCHLORIDE 1 MG: 2 INJECTION, SOLUTION INTRAMUSCULAR; INTRAVENOUS at 06:53

## 2022-11-30 RX ADMIN — MIDAZOLAM HYDROCHLORIDE 1 MG: 2 INJECTION, SOLUTION INTRAMUSCULAR; INTRAVENOUS at 06:42

## 2022-11-30 RX ADMIN — Medication 3 G: at 07:03

## 2022-11-30 RX ADMIN — SODIUM CHLORIDE, POTASSIUM CHLORIDE, SODIUM LACTATE AND CALCIUM CHLORIDE: 600; 310; 30; 20 INJECTION, SOLUTION INTRAVENOUS at 07:42

## 2022-11-30 RX ADMIN — ONDANSETRON 8 MG: 2 INJECTION INTRAMUSCULAR; INTRAVENOUS at 07:46

## 2022-11-30 RX ADMIN — DROPERIDOL 1.25 MG: 2.5 INJECTION, SOLUTION INTRAMUSCULAR; INTRAVENOUS at 07:06

## 2022-11-30 RX ADMIN — ACETAMINOPHEN 1000 MG: 500 TABLET ORAL at 06:42

## 2022-11-30 RX ADMIN — VECURONIUM BROMIDE 5 MG: 1 INJECTION, POWDER, LYOPHILIZED, FOR SOLUTION INTRAVENOUS at 07:06

## 2022-11-30 RX ADMIN — HYDROCODONE BITARTRATE AND ACETAMINOPHEN 1 TABLET: 10; 325 TABLET ORAL at 08:15

## 2022-11-30 NOTE — ANESTHESIA PROCEDURE NOTES
Airway  Urgency: elective    Date/Time: 11/30/2022 7:06 AM  Airway not difficult    General Information and Staff    Patient location during procedure: OR  CRNA/CAA: Ernesto Rossi CRNA    Indications and Patient Condition  Indications for airway management: airway protection    Preoxygenated: yes  MILS maintained throughout  Mask difficulty assessment: 1 - vent by mask    Final Airway Details  Final airway type: endotracheal airway      Successful airway: ETT  Cuffed: yes   Successful intubation technique: direct laryngoscopy  Endotracheal tube insertion site: oral  Blade: Curry  Blade size: 2  ETT size (mm): 7.5  Cormack-Lehane Classification: grade I - full view of glottis  Placement verified by: chest auscultation and capnometry   Cuff volume (mL): 5  Measured from: lips  ETT/EBT  to lips (cm): 20  Number of attempts at approach: 1  Assessment: lips, teeth, and gum same as pre-op and atraumatic intubation

## 2022-11-30 NOTE — ANESTHESIA POSTPROCEDURE EVALUATION
Patient: Gaviota Casiano    Procedure Summary     Date: 11/30/22 Room / Location: [facility] OR 09 /  [facility] OR    Anesthesia Start: 0703 Anesthesia Stop: 0800    Procedure: POSTERIOR VAGINAL REPAIR (Vagina) Diagnosis:       Rectocele      (Rectocele [N81.6])    Surgeons: John Figueroa MD Provider: Ernesto Rossi CRNA    Anesthesia Type: general ASA Status: 3          Anesthesia Type: general    Vitals  Vitals Value Taken Time   BP     Temp     Pulse 76 11/30/22 0800   Resp     SpO2 99 % 11/30/22 0800   Vitals shown include unvalidated device data.        Post Anesthesia Care and Evaluation    Patient location during evaluation: PACU  Patient participation: complete - patient participated  Level of consciousness: awake and alert  Pain management: adequate    Airway patency: patent  Anesthetic complications: No anesthetic complications    Cardiovascular status: acceptable  Respiratory status: acceptable  Hydration status: acceptable    Comments: Blood pressure 149/87, pulse 63, temperature 98 °F (36.7 °C), temperature source Temporal, resp. rate 18, last menstrual period 11/14/2022, SpO2 95 %, not currently breastfeeding.    Pt discharged from PACU based on aditi score >8

## 2022-11-30 NOTE — ANESTHESIA PREPROCEDURE EVALUATION
Anesthesia Evaluation     no history of anesthetic complications:  NPO Solid Status: > 8 hours  NPO Liquid Status: > 8 hours           Airway   Mallampati: I  TM distance: >3 FB  Neck ROM: full  Dental          Pulmonary    Cardiovascular   Exercise tolerance: good (4-7 METS)    (+) hypertension,       Neuro/Psych  (+) psychiatric history Anxiety,    GI/Hepatic/Renal/Endo    (+) morbid obesity,  thyroid problem hypothyroidism    Musculoskeletal     Abdominal    Substance History      OB/GYN          Other                        Anesthesia Plan    ASA 3     general     intravenous induction     Anesthetic plan, risks, benefits, and alternatives have been provided, discussed and informed consent has been obtained with: patient.        CODE STATUS:

## 2022-12-15 ENCOUNTER — OFFICE VISIT (OUTPATIENT)
Dept: OBSTETRICS AND GYNECOLOGY | Facility: CLINIC | Age: 47
End: 2022-12-15

## 2022-12-15 VITALS
HEIGHT: 68 IN | WEIGHT: 276 LBS | DIASTOLIC BLOOD PRESSURE: 92 MMHG | SYSTOLIC BLOOD PRESSURE: 124 MMHG | BODY MASS INDEX: 41.83 KG/M2

## 2022-12-15 DIAGNOSIS — Z09 POSTOP CHECK: Primary | ICD-10-CM

## 2022-12-15 PROCEDURE — 99024 POSTOP FOLLOW-UP VISIT: CPT | Performed by: OBSTETRICS & GYNECOLOGY

## 2022-12-15 NOTE — PROGRESS NOTES
"Gaviota Casiano is a 47 y.o. female here today for a postop visit after undergoing posterior vaginal repair on 11/30 for prolapse.  She has been doing well postoperatively since her procedure and denies any fevers, severe pain, or vaginal bleeding.      /92 (BP Location: Left arm, Patient Position: Sitting)   Ht 172.7 cm (68\")   Wt 125 kg (276 lb)   LMP 12/08/2022 (Approximate)   BMI 41.97 kg/m²    In general pleasant female in no acute distress  Abdomen is soft nontender nondistended  Her vaginal incision is healing well    Assessment: Normal early postoperative visit after posterior vaginal repair    Gaviota Casiano will return in 4 weeks for a final postop exam.  She will continue with pelvic rest.  In the meantime if she has questions or problems she will call the office.  "

## 2023-01-19 ENCOUNTER — OFFICE VISIT (OUTPATIENT)
Dept: OBSTETRICS AND GYNECOLOGY | Facility: CLINIC | Age: 48
End: 2023-01-19
Payer: COMMERCIAL

## 2023-01-19 VITALS
SYSTOLIC BLOOD PRESSURE: 130 MMHG | WEIGHT: 269 LBS | BODY MASS INDEX: 40.77 KG/M2 | DIASTOLIC BLOOD PRESSURE: 92 MMHG | HEIGHT: 68 IN

## 2023-01-19 DIAGNOSIS — Z09 POSTOP CHECK: Primary | ICD-10-CM

## 2023-01-19 PROBLEM — N81.6 RECTOCELE: Status: RESOLVED | Noted: 2022-11-30 | Resolved: 2023-01-19

## 2023-01-19 PROCEDURE — 99024 POSTOP FOLLOW-UP VISIT: CPT | Performed by: OBSTETRICS & GYNECOLOGY

## 2023-01-19 NOTE — PROGRESS NOTES
"Gaviota Casiano is a 47 y.o. female here today for a postop visit after undergoing posterior vaginal repair on 11/30.  She has been doing well postoperatively since her last visit and denies any fevers, pain, or vaginal bleeding or discharge.     /92 (BP Location: Left arm, Patient Position: Sitting)   Ht 172.7 cm (68\")   Wt 122 kg (269 lb)   BMI 40.90 kg/m²    In general pleasant female in no acute distress  Abdomen is soft nontender nondistended  Her vaginal incision is well-healed, no recurrent prolapse    Assessment: Normal postoperative exam after posterior repair    Gaviota Casiano will return to full activities without restriction.  We have discussed recurrence rate 10-15% and how to minimize those chances.  In the meantime if she has questions or problems she will call the office.  "

## 2023-07-24 ENCOUNTER — TELEPHONE (OUTPATIENT)
Dept: OBSTETRICS AND GYNECOLOGY | Facility: CLINIC | Age: 48
End: 2023-07-24
Payer: COMMERCIAL

## 2023-07-24 NOTE — TELEPHONE ENCOUNTER
Pt called with c/o intermittent vaginal bleeding and irritation. Pt states she had some odd bleeding last month and hadn't had any since 11/22 prior to that. Pt scheduled for u/s 7/28/23 at 11:00 and a visit with Dr. Cheng 11:30

## 2023-07-28 ENCOUNTER — OFFICE VISIT (OUTPATIENT)
Dept: OBSTETRICS AND GYNECOLOGY | Facility: CLINIC | Age: 48
End: 2023-07-28
Payer: COMMERCIAL

## 2023-07-28 VITALS
WEIGHT: 265 LBS | BODY MASS INDEX: 40.16 KG/M2 | SYSTOLIC BLOOD PRESSURE: 132 MMHG | HEIGHT: 68 IN | DIASTOLIC BLOOD PRESSURE: 94 MMHG

## 2023-07-28 DIAGNOSIS — N92.4 ABNORMAL PERIMENOPAUSAL BLEEDING: Primary | ICD-10-CM

## 2023-07-28 DIAGNOSIS — B37.31 CANDIDIASIS, VAGINA: ICD-10-CM

## 2023-07-28 DIAGNOSIS — R39.15 URINARY URGENCY: ICD-10-CM

## 2023-07-28 LAB
BILIRUB BLD-MCNC: NEGATIVE MG/DL
GLUCOSE UR STRIP-MCNC: NEGATIVE MG/DL
KETONES UR QL: NEGATIVE
LEUKOCYTE EST, POC: ABNORMAL
NITRITE UR-MCNC: NEGATIVE MG/ML
PH UR: 5 [PH] (ref 5–8)
PROT UR STRIP-MCNC: NEGATIVE MG/DL
RBC # UR STRIP: ABNORMAL /UL
SP GR UR: 1.01 (ref 1–1.03)
UROBILINOGEN UR QL: NORMAL

## 2023-07-28 RX ORDER — MEDROXYPROGESTERONE ACETATE 10 MG/1
10 TABLET ORAL DAILY
Qty: 7 TABLET | Refills: 0 | Status: SHIPPED | OUTPATIENT
Start: 2023-07-28

## 2023-07-28 RX ORDER — FLUCONAZOLE 150 MG/1
150 TABLET ORAL ONCE
Qty: 1 TABLET | Refills: 0 | Status: SHIPPED | OUTPATIENT
Start: 2023-07-28 | End: 2023-07-28

## 2023-07-28 NOTE — PROGRESS NOTES
"Gaviota Casiano is a 48 y.o. female here today with several complaints.  She has urinary urgency, vaginal irritation, and persistent light vaginal bleeding.  She had not had a menstrual cycle since November 2022, but had 2 weeks of light bleeding in June.  The bleeding stopped for a couple weeks and then resumed, and has been persistent over the last 3 weeks.  She denies vaginal discharge or pruritus.    Visit Vitals  /94 (BP Location: Left arm, Patient Position: Sitting)   Ht 172.7 cm (68\")   Wt 120 kg (265 lb)   BMI 40.29 kg/m²     Pleasant female no acute distress  Mood and affect normal  Breathing unlabored  Normal external genitalia, small amount of dark menstrual blood in the vagina without significant other discharge    A pelvic ultrasound was ordered and performed in the office today.  The uterus has a couple of small fibroids and the endometrium measures 5 mm.  There is a 2.5 cm simple left ovarian cyst.    Dipstick urinalysis in the office shows trace leukocyte esterase, 3+ blood, and is otherwise normal    A wet prep performed in the office shows multiple hyphal elements, but no trichomonads or clue cells    Assessment: Vaginal candidiasis, abnormal perimenopausal bleeding, uterine fibroids, dysuria    I have ordered a urine culture and given her a prescription for Diflucan once.  I have also given her a prescription for a Provera withdrawal using 10 mg daily for 7 days.  I recommend that she take a probiotic supplement as well.  We will notify her with the urine culture results return, and treat if indicated.  She will contact the office if her symptoms worsen or do not improve.  Call with questions or concerns.    "

## 2023-07-30 LAB
BACTERIA UR CULT: NO GROWTH
BACTERIA UR CULT: NORMAL

## 2024-04-16 ENCOUNTER — TRANSCRIBE ORDERS (OUTPATIENT)
Dept: ADMINISTRATIVE | Facility: HOSPITAL | Age: 49
End: 2024-04-16
Payer: COMMERCIAL

## 2024-04-16 DIAGNOSIS — Z12.31 ENCOUNTER FOR SCREENING MAMMOGRAM FOR MALIGNANT NEOPLASM OF BREAST: Primary | ICD-10-CM

## 2024-04-20 LAB
NCCN CRITERIA FLAG: ABNORMAL
TYRER CUZICK SCORE: 17.9

## 2024-04-22 NOTE — PROGRESS NOTES
This patient recently took the CARE risk assessment for a mammogram appointment. Based on the patient's responses, NCCN criteria for genetic testing was met.     Navigator follow-up:   NCCN met: I left a message for the patient to return my call if she needs assistance or would like to discuss genetic testing.

## 2024-04-23 ENCOUNTER — HOSPITAL ENCOUNTER (OUTPATIENT)
Dept: MAMMOGRAPHY | Facility: HOSPITAL | Age: 49
Discharge: HOME | End: 2024-04-23
Admitting: NURSE PRACTITIONER
Payer: COMMERCIAL

## 2024-04-23 DIAGNOSIS — Z12.31 ENCOUNTER FOR SCREENING MAMMOGRAM FOR MALIGNANT NEOPLASM OF BREAST: ICD-10-CM

## 2024-04-23 PROCEDURE — 77067 SCR MAMMO BI INCL CAD: CPT

## 2024-04-23 PROCEDURE — 77063 BREAST TOMOSYNTHESIS BI: CPT

## 2024-05-14 ENCOUNTER — TELEPHONE (OUTPATIENT)
Dept: OBSTETRICS AND GYNECOLOGY | Age: 49
End: 2024-05-14
Payer: COMMERCIAL

## 2024-05-14 NOTE — TELEPHONE ENCOUNTER
Called pt to schedule appt with Dr Figueroa for pelvic pain for first available, LM on  for pt to call and schedule

## 2024-05-15 ENCOUNTER — OFFICE VISIT (OUTPATIENT)
Dept: OBSTETRICS AND GYNECOLOGY | Age: 49
End: 2024-05-15
Payer: COMMERCIAL

## 2024-05-15 ENCOUNTER — TELEPHONE (OUTPATIENT)
Dept: OBSTETRICS AND GYNECOLOGY | Age: 49
End: 2024-05-15

## 2024-05-15 VITALS
DIASTOLIC BLOOD PRESSURE: 104 MMHG | WEIGHT: 268 LBS | BODY MASS INDEX: 40.62 KG/M2 | SYSTOLIC BLOOD PRESSURE: 162 MMHG | HEIGHT: 68 IN

## 2024-05-15 DIAGNOSIS — Z12.4 ENCOUNTER FOR SCREENING FOR CERVICAL CANCER: ICD-10-CM

## 2024-05-15 DIAGNOSIS — Z85.43 HISTORY OF OVARIAN CANCER: ICD-10-CM

## 2024-05-15 DIAGNOSIS — N81.10 PROLAPSE OF ANTERIOR VAGINAL WALL: ICD-10-CM

## 2024-05-15 DIAGNOSIS — N89.8 VAGINAL DRYNESS: ICD-10-CM

## 2024-05-15 DIAGNOSIS — E66.01 MORBID OBESITY WITH BMI OF 40.0-44.9, ADULT: ICD-10-CM

## 2024-05-15 DIAGNOSIS — R93.89 THICKENED ENDOMETRIUM: Primary | ICD-10-CM

## 2024-05-15 LAB
B-HCG UR QL: NEGATIVE
EXPIRATION DATE: NORMAL
INTERNAL NEGATIVE CONTROL: NEGATIVE
INTERNAL POSITIVE CONTROL: POSITIVE
Lab: NORMAL

## 2024-05-15 PROCEDURE — 87624 HPV HI-RISK TYP POOLED RSLT: CPT | Performed by: NURSE PRACTITIONER

## 2024-05-15 PROCEDURE — 99459 PELVIC EXAMINATION: CPT | Performed by: NURSE PRACTITIONER

## 2024-05-15 PROCEDURE — 87661 TRICHOMONAS VAGINALIS AMPLIF: CPT | Performed by: NURSE PRACTITIONER

## 2024-05-15 PROCEDURE — 81025 URINE PREGNANCY TEST: CPT | Performed by: NURSE PRACTITIONER

## 2024-05-15 PROCEDURE — G0123 SCREEN CERV/VAG THIN LAYER: HCPCS | Performed by: NURSE PRACTITIONER

## 2024-05-15 PROCEDURE — 87481 CANDIDA DNA AMP PROBE: CPT | Performed by: NURSE PRACTITIONER

## 2024-05-15 PROCEDURE — 99214 OFFICE O/P EST MOD 30 MIN: CPT | Performed by: NURSE PRACTITIONER

## 2024-05-15 PROCEDURE — 87798 DETECT AGENT NOS DNA AMP: CPT | Performed by: NURSE PRACTITIONER

## 2024-05-15 PROCEDURE — 87512 GARDNER VAG DNA QUANT: CPT | Performed by: NURSE PRACTITIONER

## 2024-05-15 PROCEDURE — 87563 M. GENITALIUM AMP PROBE: CPT | Performed by: NURSE PRACTITIONER

## 2024-05-15 NOTE — TELEPHONE ENCOUNTER
Please let pt know that discussed case with DR. Cheng. There is no contraindication for estrace vaginal cream if she wishes to try it?

## 2024-05-16 ENCOUNTER — TELEPHONE (OUTPATIENT)
Dept: OBSTETRICS AND GYNECOLOGY | Age: 49
End: 2024-05-16
Payer: COMMERCIAL

## 2024-05-16 LAB — HPV I/H RISK 4 DNA CVX QL PROBE+SIG AMP: NOT DETECTED

## 2024-05-16 NOTE — TELEPHONE ENCOUNTER
Please call pt and let her know that I looked more into her ovarian cancer history and discussed case with Dr. Cheng. Their aren't perfect recommendations as far as follow up goes but he said we could do yearly ultrasounds and yearly . Pt did mention that PCP is doing cancer markers so if that's the case then that is fine then she could just have ultrasounds with her annual wellness exam yearly. She had ultrasound at her visit yesterday anyway.     Also think should offer hormone labs to check status of menopause. There were thoughts that she was postmenopausal but it doesn't look like she's ever actually gone a full year without a period. Her lining that we attempted to get biopsy for wouldn't be considered thick if she isn't truly menopausal.     She was supposed to make appt with Dr. Figueroa when she left as well and I don't see that that has been made.

## 2024-05-16 NOTE — PROGRESS NOTES
"Chief Complaint   Patient presents with    Pelvic Pain     Patient here for pelvic pain. Patient reports lower pain and raw pain internally. Bleeding during and after intercourse. Patient reports feeling an intense pain late last week on her left side. Patient reports no \"pelvic pain\" since then, it is now just pelvic pressure. Patient reports having prolapse issues previously and has started to noticed the \"bulge\" again that has been present for about 2 weeks. Patient is starting to have to press on the bulge to have a BM and doesn't feel emptied.     Menstrual Problem     She had not had a menstrual cycle since November 2022, but had 2 weeks of light bleeding in June. Patient took provera and has not had bleeding since besides during and after intercourse. Patient reports intercourse is exteremly painful.       History:  Gaviota Casiano is a 49 y.o. female who presents today for follow-up for evaluation of the above:  Pt comes in today with complaints of pelvic pressure and possible prolapse. Also complains of feeling raw and burning vaginally with blood noted after intercourse.         ROS:  Review of Systems   Constitutional:  Negative for activity change and unexpected weight loss.   Cardiovascular:  Negative for chest pain.   Gastrointestinal:  Negative for blood in stool, constipation and diarrhea.   Endocrine: Negative for cold intolerance and heat intolerance.   Genitourinary:  Positive for dyspareunia, pelvic pain and vaginal bleeding. Negative for vaginal discharge.   Musculoskeletal:  Negative for arthralgias, back pain, neck pain and neck stiffness.   Skin:  Negative for rash.   Neurological:  Negative for dizziness and headache.   Psychiatric/Behavioral:  Negative for sleep disturbance. The patient is not nervous/anxious.        Ms. Casiano  reports that she has never smoked. She has never used smokeless tobacco. She reports current alcohol use of about 1.0 standard drink of alcohol per week. She " "reports that she does not use drugs.      Current Outpatient Medications:     ferrous sulfate 325 (65 FE) MG tablet, Take 2 tablets by mouth Every Night., Disp: , Rfl:     multivitamin with minerals tablet tablet, Take 1 tablet by mouth Daily., Disp: , Rfl:     PREBIOTIC PRODUCT PO, Take 1 tablet by mouth Daily., Disp: , Rfl:     Synthroid 137 MCG tablet, Take 1 tablet by mouth Daily., Disp: , Rfl:     Zinc 50 MG capsule, Take 1 capsule by mouth Daily., Disp: , Rfl:       OBJECTIVE:  BP (!) 162/104 (BP Location: Left arm, Patient Position: Sitting, Cuff Size: Adult)   Ht 172.7 cm (68\")   Wt 122 kg (268 lb)   LMP  (LMP Unknown)   BMI 40.75 kg/m²    Physical Exam  Vitals and nursing note reviewed. Exam conducted with a chaperone present.   Constitutional:       Appearance: She is well-developed.   HENT:      Head: Normocephalic and atraumatic.   Abdominal:      General: There is no distension.      Palpations: Abdomen is soft. There is no mass.      Tenderness: There is no abdominal tenderness. There is no right CVA tenderness, left CVA tenderness or rebound.      Hernia: There is no hernia in the left inguinal area or right inguinal area.   Genitourinary:     General: Normal vulva.      Exam position: Lithotomy position.      Labia:         Right: No rash, tenderness, lesion or injury.         Left: No rash, tenderness, lesion or injury.       Vagina: Normal. No vaginal discharge, erythema, tenderness or bleeding.      Cervix: Normal.      Uterus: Normal. Not enlarged and not tender.       Adnexa: Right adnexa normal and left adnexa normal.        Right: No mass, tenderness or fullness.          Left: No mass, tenderness or fullness.        Comments: Grade 2-3 cystocele.    Significant vaginal dryness on exam.   Musculoskeletal:      Cervical back: Normal range of motion.   Lymphadenopathy:      Lower Body: No right inguinal adenopathy. No left inguinal adenopathy.   Skin:     General: Skin is warm and dry. "   Neurological:      Mental Status: She is alert and oriented to person, place, and time.   Psychiatric:         Mood and Affect: Mood normal.         Behavior: Behavior normal.         Thought Content: Thought content normal.         Judgment: Judgment normal.         Assessment/Plan    Diagnoses and all orders for this visit:    1. Thickened endometrium (Primary)  -     POC Pregnancy, Urine    2. Encounter for screening for cervical cancer  -     Liquid-based Pap Smear, Screening  -     HPV DNA Probe, Direct - ThinPrep Vial, Cervix, Endocervix  -     Trichomonas vaginalis, PCR - ThinPrep Vial, Cervix, Endocervix    3. History of ovarian cancer    4. Morbid obesity with BMI of 40.0-44.9, adult    5. Vaginal dryness    6. Prolapse of anterior vaginal wall    Pt comes in today with several complaints. She first had concerns that her rectocele had returned. She felt more pressure in that area and has had to splint a few times for BM. Although she did state that she was just pushing externally, not internally. Also complains of pelvic pressure and vaginal burning and discomfort as well as bleeding and pain during and after intercourse.     In the discussion, there was thought that she was menopausal. She had had some irregular bleeding last year that she saw DR. Figueroa for and he gave provera for.   Ultrasound done today showed endometrial thickness of .49cm. Fibroid also noted.   Based on the thought that she was menopausal, endometrial biopsy was offered and attempted but not successful. I do question if pt is actually menopausal as it doesn't appear she has gone full year without a period. Will call to offer hormone labs to further investigate. Can also further discuss with DR. Figueroa when she follows with him for prolapse symptoms to determine need for further work up.     PT also mentioned a history of ovarian cancer. After digging through her chart I do see her first visit with Dr. Aguilar in 2017 where he  mentioned it and did CT and labs because she had complex cyst of other ovary. After further research and discussing case with Dr. Cheng- can offer yearly CA-125 and yearly ultrasound. She had mentioned that her PCP did cancer markers on her. We will call to discuss this as well.     Pt had significant vaginal dryness and friable tissue that I feel is her biggest culprit currently. I discussed coconut oil during exam and discussed estrace vaginal cream. At that time I told her I wanted to look into her cancer history more before prescribing but since I have discussed with DR. Cheng and there is no contraindication so will call to offer this to her.   Did add cultures to pap today as well to ensure no infection.   On exam, good support still felt of posterior vagina, but do note prolapse of anterior. This feeling could improve as well with use of estrace vaginal cream. She did want to go ahead and schedule follow up with DR. Stern in regards to prolapse in case symptoms weren't to improve or were to get worse. Will follow up on questionable bleeding at that time as well.        An After Visit Summary was printed and given to the patient at discharge.  Return for appt dr stern. Sooner if problems arise.          LIDIA Greenfield  Electronically Signed

## 2024-05-17 LAB
GEN CATEG CVX/VAG CYTO-IMP: NORMAL
LAB AP CASE REPORT: NORMAL
LAB AP GYN ADDITIONAL INFORMATION: NORMAL
LAB AP GYN OTHER FINDINGS: NORMAL
Lab: NORMAL
PATH INTERP SPEC-IMP: NORMAL
STAT OF ADQ CVX/VAG CYTO-IMP: NORMAL
TRICHOMONAS VAGINALIS PCR: NOT DETECTED

## 2024-05-17 NOTE — TELEPHONE ENCOUNTER
Called and spoke with patient. Informed patient that we can do yearly ultrasounds and  at yearlys. Patient VU. Patient is ok with hormone levels being checked. Please let me know when orders are placed and I will inform the patient. Patient stated that she will schedule her appointment with  when she comes to get her labs done. Patient is ok with starting Estrace.

## 2024-05-21 DIAGNOSIS — N89.8 VAGINAL DRYNESS: Primary | ICD-10-CM

## 2024-05-21 DIAGNOSIS — N93.9 VAGINAL BLEEDING: ICD-10-CM

## 2024-05-21 RX ORDER — ESTRADIOL 0.1 MG/G
1 CREAM VAGINAL 2 TIMES WEEKLY
Qty: 42.5 G | Refills: 2 | Status: SHIPPED | OUTPATIENT
Start: 2024-05-23

## 2024-05-31 ENCOUNTER — OFFICE VISIT (OUTPATIENT)
Dept: ENT CLINIC | Age: 49
End: 2024-05-31
Payer: COMMERCIAL

## 2024-05-31 VITALS
BODY MASS INDEX: 41.22 KG/M2 | WEIGHT: 272 LBS | SYSTOLIC BLOOD PRESSURE: 130 MMHG | DIASTOLIC BLOOD PRESSURE: 72 MMHG | HEIGHT: 68 IN

## 2024-05-31 DIAGNOSIS — S02.2XXA CLOSED FRACTURE OF NASAL BONE, INITIAL ENCOUNTER: Primary | ICD-10-CM

## 2024-05-31 PROCEDURE — 99213 OFFICE O/P EST LOW 20 MIN: CPT | Performed by: PHYSICIAN ASSISTANT

## 2024-05-31 RX ORDER — LEVOTHYROXINE SODIUM 137 MCG
TABLET ORAL
COMMUNITY

## 2024-05-31 ASSESSMENT — ENCOUNTER SYMPTOMS
SORE THROAT: 0
EYE DISCHARGE: 0
EYE PAIN: 0
VOICE CHANGE: 0
TROUBLE SWALLOWING: 0
FACIAL SWELLING: 0
SINUS PAIN: 0
RHINORRHEA: 0
SINUS PRESSURE: 0

## 2024-05-31 NOTE — ASSESSMENT & PLAN NOTE
Closed fracture of the tip of the nasal bone inferiorly-currently nondisplaced with no deviation  Plan: Overall recommended to the patient that no follow-up is needed due to the patient doing well.  She was reminded to call the office if she develops difficulty breathing through her nose or develops worsening nasal pain.

## 2024-05-31 NOTE — PROGRESS NOTES
sinus pressure, sinus pain, sneezing, sore throat, tinnitus, trouble swallowing and voice change.    Eyes:  Negative for pain and discharge.   Neurological:  Negative for dizziness and headaches.           Comments:     PHYSICAL EXAM:    /72   Ht 1.727 m (5' 8\")   Wt 123.4 kg (272 lb)   BMI 41.36 kg/m²   Body mass index is 41.36 kg/m².    General Appearance: well developed  and well nourished  Head/ Face: normocephalic and atraumatic  Vocal Quality: good/ normal  Ears: Right Ear: External: external ears normal Otoscopy Ear Canal: canal clear Otoscopy TM: TM's normal and TM's mobile Left Ear: External: external ears normal Otoscopy Ear Canal: canal clear Otoscopy TM: TM's normal and TM's mobile  Hearing: grossly intact  Nose: The septum was noted to be midline with no deviation.  There was no evidence of a septal hematoma.  External exam demonstrated the patient to have some mild pain at the bridge of the nose with the patient noted to have a partial fracture that is palpable to the right of the nasal spine.  There was no evidence of a fracture to the orbital region bilaterally.  Neck: supple and adenopathy none palpable  Thyroid: normal  Oral exam demonstrated the tongue to be midline with dynamized to the posterior pharynx.    Assessment & Plan:    Problem List Items Addressed This Visit       Closed fracture of nasal bones - Primary     Closed fracture of the tip of the nasal bone inferiorly-currently nondisplaced with no deviation  Plan: Overall recommended to the patient that no follow-up is needed due to the patient doing well.  She was reminded to call the office if she develops difficulty breathing through her nose or develops worsening nasal pain.            No orders of the defined types were placed in this encounter.      No orders of the defined types were placed in this encounter.      Electronically signed by SOPHIA CANTU PA-C on 5/31/24 at 2:33 PM CDT        Please note that this chart

## 2024-06-19 LAB
ESTRADIOL SERPL-MCNC: 9 PG/ML
FSH SERPL-ACNC: 86.6 MIU/ML
LH SERPL-ACNC: 41.3 MIU/ML
PROGEST SERPL-MCNC: 0.1 NG/ML
TESTOST SERPL-MCNC: 5 NG/DL (ref 4–50)

## 2025-01-28 ENCOUNTER — TRANSCRIBE ORDERS (OUTPATIENT)
Dept: ADMINISTRATIVE | Facility: HOSPITAL | Age: 50
End: 2025-01-28
Payer: COMMERCIAL

## 2025-01-28 ENCOUNTER — HOSPITAL ENCOUNTER (OUTPATIENT)
Dept: GENERAL RADIOLOGY | Facility: HOSPITAL | Age: 50
Discharge: HOME | End: 2025-01-28
Admitting: NURSE PRACTITIONER
Payer: COMMERCIAL

## 2025-01-28 DIAGNOSIS — J06.9 UPPER RESPIRATORY TRACT INFECTION, UNSPECIFIED TYPE: ICD-10-CM

## 2025-01-28 DIAGNOSIS — J06.9 UPPER RESPIRATORY TRACT INFECTION, UNSPECIFIED TYPE: Primary | ICD-10-CM

## 2025-01-28 PROCEDURE — 71046 X-RAY EXAM CHEST 2 VIEWS: CPT

## 2025-02-13 NOTE — ANESTHESIA POSTPROCEDURE EVALUATION
Patient: Gaviota Casiano    Procedure Summary     Date: 09/26/22 Room / Location: [facility] ENDOSCOPY 6 / [facility]    Anesthesia Start: 0819 Anesthesia Stop: 0856    Procedure: COLONOSCOPY WITH ANESTHESIA (N/A ) Diagnosis:       Rectal bleeding      Anal or rectal pain      (Rectal bleeding [K62.5])      (Anal or rectal pain [K62.89])    Surgeons: Suzan Muñoz MD Provider: Serg Rodgers CRNA    Anesthesia Type: MAC ASA Status: 2          Anesthesia Type: MAC    Vitals  No vitals data found for the desired time range.          Post Anesthesia Care and Evaluation    Patient location during evaluation: PACU  Patient participation: complete - patient participated  Level of consciousness: awake and awake and alert  Pain score: 0  Pain management: adequate    Airway patency: patent  Anesthetic complications: No anesthetic complications    Cardiovascular status: acceptable and stable  Respiratory status: acceptable and unassisted  Hydration status: acceptable      
contact guard

## 2025-02-17 ENCOUNTER — TRANSCRIBE ORDERS (OUTPATIENT)
Dept: ADMINISTRATIVE | Facility: HOSPITAL | Age: 50
End: 2025-02-17
Payer: COMMERCIAL

## 2025-02-17 DIAGNOSIS — R93.89 ABNORMAL CHEST XRAY: Primary | ICD-10-CM

## 2025-05-27 ENCOUNTER — TRANSCRIBE ORDERS (OUTPATIENT)
Dept: ADMINISTRATIVE | Facility: HOSPITAL | Age: 50
End: 2025-05-27
Payer: COMMERCIAL

## 2025-05-27 DIAGNOSIS — Z12.31 SCREENING MAMMOGRAM FOR BREAST CANCER: Primary | ICD-10-CM

## 2025-05-27 DIAGNOSIS — R93.89 ABNORMAL CXR (CHEST X-RAY): Primary | ICD-10-CM

## 2025-06-08 LAB
NCCN CRITERIA FLAG: ABNORMAL
TYRER CUZICK SCORE: 17.9

## 2025-06-09 ENCOUNTER — RESULTS FOLLOW-UP (OUTPATIENT)
Facility: HOSPITAL | Age: 50
End: 2025-06-09
Payer: COMMERCIAL

## 2025-06-09 ENCOUNTER — HOSPITAL ENCOUNTER (OUTPATIENT)
Dept: MAMMOGRAPHY | Facility: HOSPITAL | Age: 50
Discharge: HOME | End: 2025-06-09
Admitting: NURSE PRACTITIONER
Payer: COMMERCIAL

## 2025-06-09 DIAGNOSIS — Z12.31 SCREENING MAMMOGRAM FOR BREAST CANCER: ICD-10-CM

## 2025-06-09 PROCEDURE — 77063 BREAST TOMOSYNTHESIS BI: CPT

## 2025-06-09 PROCEDURE — 77067 SCR MAMMO BI INCL CAD: CPT

## 2025-06-10 ENCOUNTER — DOCUMENTATION (OUTPATIENT)
Dept: GENETICS | Facility: HOSPITAL | Age: 50
End: 2025-06-10
Payer: COMMERCIAL

## 2025-06-10 NOTE — PROGRESS NOTES
This patient recently completed the CARE risk assessment for a mammogram appointment. Based on the patient's responses, NCCN criteria for genetic testing was met. At the time of the assessment, the patient was provided with both written and video educational materials regarding genetic testing.    Navigator follow-up: The patient reported previous genetic testing and declined updated testing at the time of completing her risk assessment. She received [employer] messaging with my contact information should she reconsider.

## (undated) DEVICE — SPNG GZ PKNG XRAY/DETECT 4PLY 2X36IN STRL

## (undated) DEVICE — SYR CONTRL PRESS/LO FIX/M/LL W/THMB/RNG 10ML

## (undated) DEVICE — THE CHANNEL CLEANING BRUSH IS A NYLON FLEXI BRUSH ATTACHED TO A FLEXIBLE PLASTIC SHEATH DESIGNED TO SAFELY REMOVE DEBRIS FROM FLEXIBLE ENDOSCOPES.

## (undated) DEVICE — CUFF,BP,DISP,1 TUBE,ADULT,HP: Brand: MEDLINE

## (undated) DEVICE — PAD MAJOR LITHOTOMY: Brand: MEDLINE INDUSTRIES, INC.

## (undated) DEVICE — Device: Brand: DEFENDO AIR/WATER/SUCTION AND BIOPSY VALVE

## (undated) DEVICE — Device: Brand: BLACK EYE

## (undated) DEVICE — YANKAUER,BULB TIP WITH VENT: Brand: ARGYLE

## (undated) DEVICE — GLV SURG SENSICARE PI ORTHO SZ8 LF STRL

## (undated) DEVICE — SINGLE-USE POLYPECTOMY SNARE: Brand: CAPTIVATOR II

## (undated) DEVICE — BAPTIST TURNOVER KIT: Brand: MEDLINE INDUSTRIES, INC.

## (undated) DEVICE — NDL SCLEROTHRPY INTERJECT 25G 4 240 CLR

## (undated) DEVICE — ANTIBACTERIAL VIOLET BRAIDED (POLYGLACTIN 910), SYNTHETIC ABSORBABLE SUTURE: Brand: COATED VICRYL

## (undated) DEVICE — TBG SMPL FLTR LINE NASL 02/C02 A/ BX/100

## (undated) DEVICE — SENSR O2 OXIMAX FNGR A/ 18IN NONSTR

## (undated) DEVICE — ENDOGATOR AUXILIARY WATER JET CONNECTOR: Brand: ENDOGATOR

## (undated) DEVICE — MASK,OXYGEN,MED CONC,ADLT,7' TUB, UC: Brand: PENDING

## (undated) DEVICE — CONMED SCOPE SAVER BITE BLOCK, 20X27 MM: Brand: SCOPE SAVER

## (undated) DEVICE — THE SINGLE USE ETRAP – POLYP TRAP IS USED FOR SUCTION RETRIEVAL OF ENDOSCOPICALLY REMOVED POLYPS.: Brand: ETRAP

## (undated) DEVICE — MSK O2 MD CONCENTR A/ LF 7FT 1P/U

## (undated) DEVICE — FRCP BIOP ENDO CAPTURAPRO SPK SERR 2.8MM 230CM

## (undated) DEVICE — NDL HYPO PRECISIONGLIDE REG 22G 1 1/2